# Patient Record
Sex: FEMALE | Race: WHITE | NOT HISPANIC OR LATINO | Employment: UNEMPLOYED | URBAN - METROPOLITAN AREA
[De-identification: names, ages, dates, MRNs, and addresses within clinical notes are randomized per-mention and may not be internally consistent; named-entity substitution may affect disease eponyms.]

---

## 2023-02-07 PROBLEM — J33.9 NASAL POLYPS: Status: ACTIVE | Noted: 2023-02-07

## 2023-02-07 PROBLEM — F12.10 MARIJUANA ABUSE: Status: ACTIVE | Noted: 2023-02-07

## 2023-02-07 PROBLEM — M19.112 OSTEOARTHRITIS OF SHOULDERS DUE TO ROTATOR CUFF INJURY, BILATERAL: Status: ACTIVE | Noted: 2023-02-07

## 2023-02-07 PROBLEM — M51.369 LUMBAR DEGENERATIVE DISC DISEASE: Status: ACTIVE | Noted: 2023-02-07

## 2023-02-07 PROBLEM — I10 ESSENTIAL HYPERTENSION: Status: ACTIVE | Noted: 2023-02-07

## 2023-02-07 PROBLEM — S46.001S OSTEOARTHRITIS OF SHOULDERS DUE TO ROTATOR CUFF INJURY, BILATERAL: Status: ACTIVE | Noted: 2023-02-07

## 2023-02-07 PROBLEM — M89.9 DISORDER OF BONE: Status: ACTIVE | Noted: 2023-02-07

## 2023-02-07 PROBLEM — M79.18 MYOFASCIAL PAIN SYNDROME, CERVICAL: Status: ACTIVE | Noted: 2023-02-07

## 2023-02-07 PROBLEM — M51.36 LUMBAR DEGENERATIVE DISC DISEASE: Status: ACTIVE | Noted: 2023-02-07

## 2023-02-07 PROBLEM — N32.9 LESION OF BLADDER: Status: ACTIVE | Noted: 2023-02-07

## 2023-02-07 PROBLEM — N31.8 FREQUENCY-URGENCY SYNDROME: Status: ACTIVE | Noted: 2023-02-07

## 2023-02-07 PROBLEM — K63.5 BENIGN COLONIC POLYP: Status: ACTIVE | Noted: 2023-02-07

## 2023-02-07 PROBLEM — F11.20 OPIOID DEPENDENCE (MULTI): Status: ACTIVE | Noted: 2023-02-07

## 2023-02-07 PROBLEM — G89.4 CHRONIC PAIN SYNDROME: Status: ACTIVE | Noted: 2023-02-07

## 2023-02-07 PROBLEM — S46.002S OSTEOARTHRITIS OF SHOULDERS DUE TO ROTATOR CUFF INJURY, BILATERAL: Status: ACTIVE | Noted: 2023-02-07

## 2023-02-07 PROBLEM — E78.5 DYSLIPIDEMIA, GOAL LDL BELOW 100: Status: ACTIVE | Noted: 2023-02-07

## 2023-02-07 PROBLEM — K64.9 HEMORRHOIDS: Status: ACTIVE | Noted: 2023-02-07

## 2023-02-07 PROBLEM — K58.0 IRRITABLE BOWEL SYNDROME WITH DIARRHEA: Status: ACTIVE | Noted: 2023-02-07

## 2023-02-07 PROBLEM — M19.111 OSTEOARTHRITIS OF SHOULDERS DUE TO ROTATOR CUFF INJURY, BILATERAL: Status: ACTIVE | Noted: 2023-02-07

## 2023-02-07 PROBLEM — F33.1 MODERATE EPISODE OF RECURRENT MAJOR DEPRESSIVE DISORDER (MULTI): Status: ACTIVE | Noted: 2023-02-07

## 2023-02-07 PROBLEM — J45.20 MILD INTERMITTENT ASTHMA WITHOUT COMPLICATION (HHS-HCC): Status: ACTIVE | Noted: 2023-02-07

## 2023-02-07 PROBLEM — J30.9 ALLERGIC RHINITIS: Status: ACTIVE | Noted: 2023-02-07

## 2023-02-07 PROBLEM — E66.811 CLASS 1 OBESITY DUE TO EXCESS CALORIES WITH SERIOUS COMORBIDITY AND BODY MASS INDEX (BMI) OF 30.0 TO 30.9 IN ADULT: Status: ACTIVE | Noted: 2023-02-07

## 2023-02-07 PROBLEM — R55 NEUROCARDIOGENIC PRE-SYNCOPE: Status: ACTIVE | Noted: 2023-02-07

## 2023-02-07 PROBLEM — K21.9 GASTROESOPHAGEAL REFLUX DISEASE WITHOUT ESOPHAGITIS: Status: ACTIVE | Noted: 2023-02-07

## 2023-02-07 PROBLEM — J32.0 CHRONIC MAXILLARY SINUSITIS: Status: RESOLVED | Noted: 2023-02-07 | Resolved: 2023-02-07

## 2023-02-07 PROBLEM — E66.09 CLASS 1 OBESITY DUE TO EXCESS CALORIES WITH SERIOUS COMORBIDITY AND BODY MASS INDEX (BMI) OF 30.0 TO 30.9 IN ADULT: Status: ACTIVE | Noted: 2023-02-07

## 2023-02-07 RX ORDER — FLUTICASONE PROPIONATE 50 MCG
2 SPRAY, SUSPENSION (ML) NASAL DAILY
COMMUNITY
Start: 2021-03-15

## 2023-02-07 RX ORDER — PANTOPRAZOLE SODIUM 40 MG/1
1 TABLET, DELAYED RELEASE ORAL 2 TIMES DAILY
COMMUNITY
Start: 2020-05-11 | End: 2023-09-20

## 2023-02-07 RX ORDER — NALOXONE HYDROCHLORIDE 4 MG/.1ML
SPRAY NASAL
COMMUNITY
Start: 2020-05-26 | End: 2024-04-12 | Stop reason: SDUPTHER

## 2023-02-07 RX ORDER — SENNOSIDES 25 MG/1
TABLET, FILM COATED ORAL
COMMUNITY
Start: 2022-10-07

## 2023-02-07 RX ORDER — GABAPENTIN 600 MG/1
1 TABLET ORAL 3 TIMES DAILY
COMMUNITY
Start: 2019-02-20 | End: 2024-01-08

## 2023-02-07 RX ORDER — METAXALONE 800 MG/1
1 TABLET ORAL 2 TIMES DAILY
COMMUNITY
Start: 2019-07-03

## 2023-02-07 RX ORDER — LISINOPRIL 20 MG/1
1 TABLET ORAL DAILY
COMMUNITY
Start: 2019-08-15 | End: 2023-07-13

## 2023-02-07 RX ORDER — LIDOCAINE 50 MG/G
PATCH TOPICAL
COMMUNITY
Start: 2020-09-09

## 2023-02-07 RX ORDER — ALBUTEROL SULFATE 90 UG/1
AEROSOL, METERED RESPIRATORY (INHALATION)
COMMUNITY
Start: 2020-03-24

## 2023-02-07 RX ORDER — MONTELUKAST SODIUM 10 MG/1
1 TABLET ORAL NIGHTLY
COMMUNITY
Start: 2016-06-08 | End: 2023-06-23

## 2023-02-07 RX ORDER — PAROXETINE HYDROCHLORIDE 40 MG/1
1 TABLET, FILM COATED ORAL DAILY
COMMUNITY
Start: 2021-12-30 | End: 2023-03-28

## 2023-02-07 RX ORDER — BUPRENORPHINE AND NALOXONE 8; 2 MG/1; MG/1
1 FILM, SOLUBLE BUCCAL; SUBLINGUAL DAILY
COMMUNITY
Start: 2020-05-19 | End: 2023-03-20 | Stop reason: SDUPTHER

## 2023-03-20 ENCOUNTER — OFFICE VISIT (OUTPATIENT)
Dept: PRIMARY CARE | Facility: CLINIC | Age: 68
End: 2023-03-20
Payer: MEDICARE

## 2023-03-20 VITALS — DIASTOLIC BLOOD PRESSURE: 69 MMHG | BODY MASS INDEX: 31.07 KG/M2 | WEIGHT: 181 LBS | SYSTOLIC BLOOD PRESSURE: 125 MMHG

## 2023-03-20 DIAGNOSIS — F11.20 UNCOMPLICATED OPIOID DEPENDENCE (MULTI): ICD-10-CM

## 2023-03-20 LAB
POC AMPHETAMINE: NEGATIVE NG/ML
POC BARBITURATES: NEGATIVE NG/ML
POC BENZODIAZEPINES: NEGATIVE NG/ML
POC BUPRENORPHINE URINE: POSITIVE NG/ML
POC COCAINE: NEGATIVE NG/ML
POC MDMA (NG/ML) IN URINE: NEGATIVE NG/ML
POC METHADONE MANUALLY ENTERED: NEGATIVE NG/ML
POC METHAMPHETAMINE: NEGATIVE NG/ML
POC MORPHINE URINE: NEGATIVE NG/ML
POC OPIATES: NEGATIVE NG/ML
POC OXYCODONE: NEGATIVE NG/ML
POC PHENCYCLIDINE (PCP): NEGATIVE NG/ML
POC THC: NEGATIVE NG/ML
POC TICYCLIC ANTIDEPRESSANTS (TCA): ABNORMAL NG/ML

## 2023-03-20 PROCEDURE — 80348 DRUG SCREENING BUPRENORPHINE: CPT

## 2023-03-20 PROCEDURE — 80373 DRUG SCREENING TRAMADOL: CPT

## 2023-03-20 PROCEDURE — 80305 DRUG TEST PRSMV DIR OPT OBS: CPT | Performed by: FAMILY MEDICINE

## 2023-03-20 PROCEDURE — 1036F TOBACCO NON-USER: CPT | Performed by: FAMILY MEDICINE

## 2023-03-20 PROCEDURE — 1160F RVW MEDS BY RX/DR IN RCRD: CPT | Performed by: FAMILY MEDICINE

## 2023-03-20 PROCEDURE — 80361 OPIATES 1 OR MORE: CPT

## 2023-03-20 PROCEDURE — 1159F MED LIST DOCD IN RCRD: CPT | Performed by: FAMILY MEDICINE

## 2023-03-20 PROCEDURE — 80365 DRUG SCREENING OXYCODONE: CPT

## 2023-03-20 PROCEDURE — 3074F SYST BP LT 130 MM HG: CPT | Performed by: FAMILY MEDICINE

## 2023-03-20 PROCEDURE — 99214 OFFICE O/P EST MOD 30 MIN: CPT | Performed by: FAMILY MEDICINE

## 2023-03-20 PROCEDURE — 80358 DRUG SCREENING METHADONE: CPT

## 2023-03-20 PROCEDURE — 3078F DIAST BP <80 MM HG: CPT | Performed by: FAMILY MEDICINE

## 2023-03-20 PROCEDURE — 80346 BENZODIAZEPINES1-12: CPT

## 2023-03-20 PROCEDURE — 80354 DRUG SCREENING FENTANYL: CPT

## 2023-03-20 PROCEDURE — 80307 DRUG TEST PRSMV CHEM ANLYZR: CPT

## 2023-03-20 PROCEDURE — 80368 SEDATIVE HYPNOTICS: CPT

## 2023-03-20 RX ORDER — BUPRENORPHINE AND NALOXONE 8; 2 MG/1; MG/1
1 FILM, SOLUBLE BUCCAL; SUBLINGUAL DAILY
Qty: 28 FILM | Refills: 0 | Status: SHIPPED | OUTPATIENT
Start: 2023-03-20 | End: 2023-04-17 | Stop reason: SDUPTHER

## 2023-03-20 ASSESSMENT — PATIENT HEALTH QUESTIONNAIRE - PHQ9
2. FEELING DOWN, DEPRESSED OR HOPELESS: NOT AT ALL
SUM OF ALL RESPONSES TO PHQ9 QUESTIONS 1 AND 2: 0
1. LITTLE INTEREST OR PLEASURE IN DOING THINGS: NOT AT ALL

## 2023-03-20 NOTE — PROGRESS NOTES
Subjective   Patient ID: Jenna Ca is a 67 y.o. female who presents for opioid dependency (Uds, bup, uds2).    HPI   Pt felt functional at daily activities with buprenorphine tx. Pt denies having cravings for opioids or withdrawals symptoms. Pt denies abusing any opioids. Pt has been attending NA/AA meeting or counseling. No eoth use. No HA, constipation, imbalance or confusion. No confusion, sweating, agitation. No trouble falling or staying asleep. Pt would have cravings without buprenorphine treatment. Pt denies depressed mood. No HI/SI.    Review of Systems    Objective   /69   Wt 82.1 kg (181 lb)   BMI 31.07 kg/m²     Physical Exam  no acute distress, well groomed, eyes: no sclera icterus, normal pupil size, neck is supple, no exertional respiration, Lungs: CTA bilaterally, heart: RRR, abdomen: soft, no tenderness, BS+, normal balance, good judgment, memory and insight, good mood. Normal affect. No HI/SI or paranoia    Assessment/Plan     Opioid dependence, Pt denies cravings and withdrawals. Pt denies any opioid abuse. Pt felt functional with current buprenorphine tx.    I personally reviewed pt's urine drug screen performed in office today.  The urine drug screen was negative for opiate but + for bup.   I reviewed pt's updated OARRS report today.  The OARRS report showed that pt filled buprenorphine as prescribed. Caution patient that transmucosal buprenorphine can increase the risk of dental problems, including tooth decay, cavities, oral infections and loss of teeth. Recommend  the pt to see a dentist for dental exam twice a year. Recommend pt to gently rinse teeth and gums with water after the medicine has completely dissolved.  Wait at least 1 hour before brushing teeth. Informed pt of the alternative treatment for opioid dependence such as Probuphine implant, Sublocade injection, Vivitrol shot, oral naltrexone pills and in-patient rehabilitation. Pt prefers to cont current dose of  buprenorphine treatment.   Pt understood to avoid  benzodiazepine if possible and alcohol which can interact with buprenorphine causing respiratory suppression and potential death. Pt understood that buprenorphine is addictive. Caution that buprenorphine diversion is against the law. Recommend pt to keep buprenorphine out of reach of children. Recommend to keep naloxone available in case of opioid OD. Encourage counseling for opioid dependence. Pt should not drive or operate machinery if incoordination or confusion occurs. I have considered the risks of abuse, dependence, addiction and diversion. I believe that it is clinically appropriate for the pt to continue current buprenorphine treatment. RTC as scheduled.

## 2023-03-23 LAB
6-ACETYLMORPHINE: <25 NG/ML
7-AMINOCLONAZEPAM: <25 NG/ML
ALPHA-HYDROXYALPRAZOLAM: <25 NG/ML
ALPHA-HYDROXYMIDAZOLAM: <25 NG/ML
ALPRAZOLAM: <25 NG/ML
AMPHETAMINE (PRESENCE) IN URINE BY SCREEN METHOD: NORMAL
BARBITURATES PRESENCE IN URINE BY SCREEN METHOD: NORMAL
CANNABINOIDS IN URINE BY SCREEN METHOD: NORMAL
CHLORDIAZEPOXIDE: <25 NG/ML
CLONAZEPAM: <25 NG/ML
COCAINE (PRESENCE) IN URINE BY SCREEN METHOD: NORMAL
CODEINE: <50 NG/ML
CREATINE, URINE FOR DRUG: 141.7 MG/DL
DIAZEPAM: <25 NG/ML
DRUG SCREEN COMMENT URINE: NORMAL
EDDP: <25 NG/ML
FENTANYL CONFIRMATION, URINE: <2.5 NG/ML
HYDROCODONE: <25 NG/ML
HYDROMORPHONE: <25 NG/ML
LORAZEPAM: <25 NG/ML
METHADONE CONFIRMATION,URINE: <25 NG/ML
MIDAZOLAM: <25 NG/ML
MORPHINE URINE: <50 NG/ML
NORDIAZEPAM: <25 NG/ML
NORFENTANYL: <2.5 NG/ML
NORHYDROCODONE: <25 NG/ML
NOROXYCODONE: <25 NG/ML
O-DESMETHYLTRAMADOL: <50 NG/ML
OXAZEPAM: <25 NG/ML
OXYCODONE: <25 NG/ML
OXYMORPHONE: <25 NG/ML
PHENCYCLIDINE (PRESENCE) IN URINE BY SCREEN METHOD: NORMAL
TEMAZEPAM: <25 NG/ML
TRAMADOL: <50 NG/ML
ZOLPIDEM METABOLITE (ZCA): <25 NG/ML
ZOLPIDEM: <25 NG/ML

## 2023-03-28 DIAGNOSIS — F33.1 MAJOR DEPRESSIVE DISORDER, RECURRENT, MODERATE (MULTI): ICD-10-CM

## 2023-03-28 RX ORDER — PAROXETINE HYDROCHLORIDE 40 MG/1
TABLET, FILM COATED ORAL
Qty: 90 TABLET | Refills: 3 | Status: SHIPPED | OUTPATIENT
Start: 2023-03-28 | End: 2024-03-18

## 2023-04-03 ENCOUNTER — APPOINTMENT (OUTPATIENT)
Dept: PRIMARY CARE | Facility: CLINIC | Age: 68
End: 2023-04-03
Payer: MEDICARE

## 2023-04-03 LAB
BUPRENORPHINE ,URINE: <2 NG/ML
BUPRENORPHINE GLUC, URINE: 214 NG/ML
NALOXONE, URINE: <100 NG/ML
NORBUPRENORPHINE GLUC,URINE: 193 NG/ML
NORBUPRENORPHINE, URINE: 81 NG/ML

## 2023-04-17 ENCOUNTER — OFFICE VISIT (OUTPATIENT)
Dept: PRIMARY CARE | Facility: CLINIC | Age: 68
End: 2023-04-17
Payer: MEDICARE

## 2023-04-17 VITALS — SYSTOLIC BLOOD PRESSURE: 120 MMHG | DIASTOLIC BLOOD PRESSURE: 62 MMHG

## 2023-04-17 DIAGNOSIS — F11.20 UNCOMPLICATED OPIOID DEPENDENCE (MULTI): Primary | ICD-10-CM

## 2023-04-17 PROCEDURE — 3074F SYST BP LT 130 MM HG: CPT | Performed by: FAMILY MEDICINE

## 2023-04-17 PROCEDURE — 99214 OFFICE O/P EST MOD 30 MIN: CPT | Performed by: FAMILY MEDICINE

## 2023-04-17 PROCEDURE — 3078F DIAST BP <80 MM HG: CPT | Performed by: FAMILY MEDICINE

## 2023-04-17 PROCEDURE — 80305 DRUG TEST PRSMV DIR OPT OBS: CPT | Performed by: FAMILY MEDICINE

## 2023-04-17 PROCEDURE — 1160F RVW MEDS BY RX/DR IN RCRD: CPT | Performed by: FAMILY MEDICINE

## 2023-04-17 PROCEDURE — 1036F TOBACCO NON-USER: CPT | Performed by: FAMILY MEDICINE

## 2023-04-17 PROCEDURE — 1159F MED LIST DOCD IN RCRD: CPT | Performed by: FAMILY MEDICINE

## 2023-04-17 RX ORDER — BUPRENORPHINE AND NALOXONE 8; 2 MG/1; MG/1
1 FILM, SOLUBLE BUCCAL; SUBLINGUAL DAILY
Qty: 28 FILM | Refills: 0 | Status: SHIPPED | OUTPATIENT
Start: 2023-04-17 | End: 2023-05-16 | Stop reason: SDUPTHER

## 2023-04-17 NOTE — PROGRESS NOTES
Subjective   Patient ID: Jenna Ca is a 67 y.o. female who presents for buprenorphine treatment.    HPI   Pt denies having cravings for opioids or withdrawals symptoms. Pt denies abusing any opioids.  Pt felt functional at daily activities with buprenorphine tx. Pt has been attending NA/AA meeting or counseling. Pt denies  eoth use. No HA, constipation, imbalance, confusion, sweating, agitation. No insomnia. Pt would have cravings without buprenorphine treatment. Pt denies depressed mood.     Review of Systems    Objective   There were no vitals taken for this visit.    Physical Exam  Well groomed, eyes: no sclera icterus, normal pupil size, no exertional respiration, Lungs: CTA bilaterally, heart: RRR, abdomen: soft, no tenderness, BS+, normal balance, good judgment, good mood. Normal affect. No HI/SI     Assessment/Plan     Opioid dependence, Pt denies cravings and withdrawals. Pt denies any opioid abuse.   I personally reviewed pt's urine drug screen performed in office today.  The urine drug screen was negative for opiate but + for bup.   I reviewed pt's updated OARRS report today.  The OARRS report showed no physician shopping. Caution patient that transmucosal buprenorphine can increase the risk of tooth decay, cavities, oral infections and loss of teeth. Alternative treatment for opioid dependence such as Probuphine implant, Sublocade injection are available.  Pt understood that buprenorphine is addictive. Caution that buprenorphine diversion is against the law. Recommend pt to keep buprenorphine out of reach of children. Recommend to keep naloxone available in case of opioid OD. Encourage counseling for opioid dependence. Pt should not drive or operate machinery if incoordination or confusion occurs. I have considered the risks of abuse, dependence, addiction and diversion. I believe that it is clinically appropriate for the pt to continue current buprenorphine treatment. RTC as scheduled.

## 2023-04-18 LAB
POC AMPHETAMINE: NEGATIVE NG/ML
POC BARBITURATES: NEGATIVE NG/ML
POC BENZODIAZEPINES: NEGATIVE NG/ML
POC BUPRENORPHINE URINE: POSITIVE NG/ML
POC COCAINE: NEGATIVE NG/ML
POC MDMA (NG/ML) IN URINE: NEGATIVE NG/ML
POC METHADONE MANUALLY ENTERED: NEGATIVE NG/ML
POC METHAMPHETAMINE: NEGATIVE NG/ML
POC MORPHINE URINE: NEGATIVE NG/ML
POC OPIATES: NEGATIVE NG/ML
POC OXYCODONE: NEGATIVE NG/ML
POC PHENCYCLIDINE (PCP): NEGATIVE NG/ML
POC THC: NEGATIVE NG/ML

## 2023-05-16 ENCOUNTER — OFFICE VISIT (OUTPATIENT)
Dept: PRIMARY CARE | Facility: CLINIC | Age: 68
End: 2023-05-16
Payer: MEDICARE

## 2023-05-16 VITALS — SYSTOLIC BLOOD PRESSURE: 130 MMHG | DIASTOLIC BLOOD PRESSURE: 76 MMHG | HEART RATE: 76 BPM | RESPIRATION RATE: 15 BRPM

## 2023-05-16 DIAGNOSIS — F11.20 UNCOMPLICATED OPIOID DEPENDENCE (MULTI): ICD-10-CM

## 2023-05-16 PROCEDURE — 1160F RVW MEDS BY RX/DR IN RCRD: CPT | Performed by: FAMILY MEDICINE

## 2023-05-16 PROCEDURE — 99214 OFFICE O/P EST MOD 30 MIN: CPT | Performed by: FAMILY MEDICINE

## 2023-05-16 PROCEDURE — 1036F TOBACCO NON-USER: CPT | Performed by: FAMILY MEDICINE

## 2023-05-16 PROCEDURE — 80305 DRUG TEST PRSMV DIR OPT OBS: CPT | Performed by: FAMILY MEDICINE

## 2023-05-16 PROCEDURE — 3075F SYST BP GE 130 - 139MM HG: CPT | Performed by: FAMILY MEDICINE

## 2023-05-16 PROCEDURE — 1159F MED LIST DOCD IN RCRD: CPT | Performed by: FAMILY MEDICINE

## 2023-05-16 PROCEDURE — 3078F DIAST BP <80 MM HG: CPT | Performed by: FAMILY MEDICINE

## 2023-05-16 RX ORDER — BUPRENORPHINE AND NALOXONE 8; 2 MG/1; MG/1
1 FILM, SOLUBLE BUCCAL; SUBLINGUAL DAILY
Qty: 28 FILM | Refills: 0 | Status: SHIPPED | OUTPATIENT
Start: 2023-05-16 | End: 2023-06-12 | Stop reason: SDUPTHER

## 2023-05-16 NOTE — PROGRESS NOTES
Subjective   Patient ID: Jenna Ca is a 67 y.o. female who presents for opioid dependency (uds).    HPI   Pt felt functional at daily activities with buprenorphine tx. Pt denies having cravings for opioids or withdrawals symptoms. Pt denies abusing any opioids. Pt has been attending NA/AA meeting or counseling. No eoth use. No HA, constipation, imbalance or confusion. No trouble falling or staying asleep. Pt would have cravings and withdrawals without buprenorphine treatment. No depressed mood.   Review of Systems    Objective   /76   Pulse 76   Resp 15     Physical Exam  No acute distress, well groomed, eyes: no sclera icterus, normal pupil size, no exertional respiration, Lungs: CTA bilaterally, heart: RRR, abdomen: soft, no tenderness, BS+, normal balance, good judgment, memory and insight, good mood. Normal affect. No HI/SI or paranoia    Assessment/Plan        Opioid dependence, Pt denies cravings and withdrawals. Pt denies any opioid abuse. Pt felt functional with buprenorphine tx.    I personally reviewed pt's urine drug screen performed in office today.  The urine drug screen was + for buprenorphine but negative for opiate. I reviewed pt's updated OARRS report today.  The OARRS report showed that pt filled buprenorphine as prescribed. Pt has not filled other opioid medications recently. Recommend the pt to see a dentist for dental exam twice a year while on buprenorphine. Recommend pt to gently rinse teeth and gums with water after the medicine has completely dissolved.  Informed pt of the alternative medication treatment for opioid dependence such as Probuphine implant, SUBLOCADE injection, vivitrol shot, oral naltrexone pills. Pt prefers to continue current of buprenorphine treatment. Pt understood to avoid alcohol and benzodiazepine which can interact with buprenorphine causing respiratory suppression and potential death. Pt understood that buprenorphine is addictive. Caution that  buprenorphine diversion is against the law. Recommend pt to keep buprenorphine out of reach of children. Recommend to keep naloxone available in case of opioid OD. Encourage counseling for opioid dependence. Pt should not drive or operate machinery if incoordination or confusion occurs. I have considered the risks of abuse, dependence, addiction and diversion. I believe that it is clinically appropriate for the pt to continue current buprenorphine treatment. RTC as scheduled.

## 2023-06-12 ENCOUNTER — OFFICE VISIT (OUTPATIENT)
Dept: PRIMARY CARE | Facility: CLINIC | Age: 68
End: 2023-06-12
Payer: MEDICARE

## 2023-06-12 VITALS — SYSTOLIC BLOOD PRESSURE: 125 MMHG | DIASTOLIC BLOOD PRESSURE: 76 MMHG

## 2023-06-12 DIAGNOSIS — Z79.899 PRESCRIPTION MEDICATION STARTED: ICD-10-CM

## 2023-06-12 DIAGNOSIS — F11.20 UNCOMPLICATED OPIOID DEPENDENCE (MULTI): Primary | ICD-10-CM

## 2023-06-12 PROBLEM — F12.10 MARIJUANA ABUSE: Status: RESOLVED | Noted: 2023-02-07 | Resolved: 2023-06-12

## 2023-06-12 LAB
POC AMPHETAMINE: NEGATIVE NG/ML
POC COCAINE: NEGATIVE NG/ML
POC METHAMPHETAMINE: NEGATIVE NG/ML
POC OPIATES: NEGATIVE NG/ML
POC THC: NEGATIVE NG/ML

## 2023-06-12 PROCEDURE — 1160F RVW MEDS BY RX/DR IN RCRD: CPT | Performed by: FAMILY MEDICINE

## 2023-06-12 PROCEDURE — 99214 OFFICE O/P EST MOD 30 MIN: CPT | Performed by: FAMILY MEDICINE

## 2023-06-12 PROCEDURE — 80305 DRUG TEST PRSMV DIR OPT OBS: CPT | Performed by: FAMILY MEDICINE

## 2023-06-12 PROCEDURE — 1036F TOBACCO NON-USER: CPT | Performed by: FAMILY MEDICINE

## 2023-06-12 PROCEDURE — 3074F SYST BP LT 130 MM HG: CPT | Performed by: FAMILY MEDICINE

## 2023-06-12 PROCEDURE — 1159F MED LIST DOCD IN RCRD: CPT | Performed by: FAMILY MEDICINE

## 2023-06-12 PROCEDURE — 3078F DIAST BP <80 MM HG: CPT | Performed by: FAMILY MEDICINE

## 2023-06-12 RX ORDER — BUPRENORPHINE AND NALOXONE 8; 2 MG/1; MG/1
1 FILM, SOLUBLE BUCCAL; SUBLINGUAL DAILY
Qty: 28 FILM | Refills: 0 | Status: SHIPPED | OUTPATIENT
Start: 2023-06-12 | End: 2023-07-10 | Stop reason: SDUPTHER

## 2023-06-12 NOTE — PROGRESS NOTES
Subjective   Patient ID: Jenna Ca is a 67 y.o. female who presents for Opioid dependence treatment      HPI   Pt stated that current dose of buprenorphine controlled cravings for opioids and withdrawals symptoms well. Pt feels normal and performs daily activities well.  Pt denies abusing any opioids. pt has social support and has been having counseling. pt denies drinking alcohol or taking benzodiazepine. No SE from buprenorphine  such as HA, constipation, imbalance or confusion. Pt sleeps well most night. Pt would have cravings and withdrawals without buprenorphine treatment. Pt denies depressed mood.   Review of Systems    Objective   /76     Physical Exam  No acute distress, well groomed, eyes: no sclera icterus, no exertional respiration, Lungs: CTA bilaterally, heart: RRR, abdomen: soft, no tenderness, BS+, normal station and gait, good  mood     Assessment/Plan     Opioid dependence, Pt stated that current dose of buprenorphine treatment controlled cravings and withdrawals well. Pt denies any opioid abuse.   I personally reviewed pt's urine drug screen done in office today.  The urine drug screen was negative for opiate. I reviewed pt's updated OARRS report today.  The OARRS report showed that pt filled buprenorphine consistently as prescribed. Caution patient that alcohol and benzo should be avoided while on buprenorphine. Pt understood that buprenorphine is addictive. Lending meds is illegal. Recommend pt to lock  buprenorphine in a safe place and  out of reach of children. Recommend to keep naloxone available in case of opioid OD. Cont counseling for opioid dependence. Pt should not drive or operate machinery if incoordination or confusion occurs. I have considered the risks of abuse, dependence, addiction and diversion. I believe that it is clinically appropriate for the pt to continue current buprenorphine treatment. Continue current dose of buprenorphine. RTC as scheduled

## 2023-06-20 ENCOUNTER — TELEPHONE (OUTPATIENT)
Dept: PRIMARY CARE | Facility: CLINIC | Age: 68
End: 2023-06-20
Payer: MEDICARE

## 2023-06-20 DIAGNOSIS — F33.1 MODERATE EPISODE OF RECURRENT MAJOR DEPRESSIVE DISORDER (MULTI): ICD-10-CM

## 2023-06-20 DIAGNOSIS — E66.09 CLASS 1 OBESITY DUE TO EXCESS CALORIES WITH SERIOUS COMORBIDITY AND BODY MASS INDEX (BMI) OF 30.0 TO 30.9 IN ADULT: Primary | ICD-10-CM

## 2023-06-20 DIAGNOSIS — E78.5 DYSLIPIDEMIA, GOAL LDL BELOW 100: ICD-10-CM

## 2023-06-20 DIAGNOSIS — R73.02 IGT (IMPAIRED GLUCOSE TOLERANCE): ICD-10-CM

## 2023-06-20 RX ORDER — ACETAMINOPHEN 500 MG
500 TABLET ORAL
COMMUNITY

## 2023-06-20 RX ORDER — PANCRELIPASE 36000; 180000; 114000 [USP'U]/1; [USP'U]/1; [USP'U]/1
3 CAPSULE, DELAYED RELEASE PELLETS ORAL
COMMUNITY
End: 2024-02-01 | Stop reason: ALTCHOICE

## 2023-06-21 ENCOUNTER — APPOINTMENT (OUTPATIENT)
Dept: PRIMARY CARE | Facility: CLINIC | Age: 68
End: 2023-06-21
Payer: MEDICARE

## 2023-06-21 NOTE — TELEPHONE ENCOUNTER
Called Jenna / labs ordered  
She has appointment with you August 1    Asking if there are any labs you want done before appointment ?  
patient

## 2023-06-23 DIAGNOSIS — J30.9 ALLERGIC RHINITIS, UNSPECIFIED: ICD-10-CM

## 2023-06-23 RX ORDER — MONTELUKAST SODIUM 10 MG/1
TABLET ORAL
Qty: 90 TABLET | Refills: 3 | Status: SHIPPED | OUTPATIENT
Start: 2023-06-23

## 2023-07-10 ENCOUNTER — OFFICE VISIT (OUTPATIENT)
Dept: PRIMARY CARE | Facility: CLINIC | Age: 68
End: 2023-07-10
Payer: MEDICARE

## 2023-07-10 VITALS — DIASTOLIC BLOOD PRESSURE: 68 MMHG | SYSTOLIC BLOOD PRESSURE: 132 MMHG

## 2023-07-10 DIAGNOSIS — F11.20 UNCOMPLICATED OPIOID DEPENDENCE (MULTI): Primary | ICD-10-CM

## 2023-07-10 PROCEDURE — 99213 OFFICE O/P EST LOW 20 MIN: CPT | Performed by: FAMILY MEDICINE

## 2023-07-10 PROCEDURE — 1160F RVW MEDS BY RX/DR IN RCRD: CPT | Performed by: FAMILY MEDICINE

## 2023-07-10 PROCEDURE — 1036F TOBACCO NON-USER: CPT | Performed by: FAMILY MEDICINE

## 2023-07-10 PROCEDURE — 3078F DIAST BP <80 MM HG: CPT | Performed by: FAMILY MEDICINE

## 2023-07-10 PROCEDURE — 1159F MED LIST DOCD IN RCRD: CPT | Performed by: FAMILY MEDICINE

## 2023-07-10 PROCEDURE — 3008F BODY MASS INDEX DOCD: CPT | Performed by: FAMILY MEDICINE

## 2023-07-10 PROCEDURE — 3075F SYST BP GE 130 - 139MM HG: CPT | Performed by: FAMILY MEDICINE

## 2023-07-10 RX ORDER — BUPRENORPHINE AND NALOXONE 8; 2 MG/1; MG/1
1 FILM, SOLUBLE BUCCAL; SUBLINGUAL DAILY
Qty: 28 FILM | Refills: 0 | Status: SHIPPED | OUTPATIENT
Start: 2023-07-10 | End: 2023-08-07 | Stop reason: SDUPTHER

## 2023-07-10 NOTE — PROGRESS NOTES
Subjective   Patient ID: Jenna Ca is a 67 y.o. female who presents for Opioid dependence treatment      HPI   Cravings for opioids and withdrawals symptoms were controlled with current dose of buprenorphine tx. Pt denies abusing any opioids. Patient has been attending meeting/counseling as recommended. Pt felt normal and functional with buprenorphine tx. Pt was able to maintain good relationship with other people.  Pt had good family  support. Pt denies drinking eoth. No headache, constipation, imbalance, insomnia, mental cloudiness, sweating, drowsiness or confusion. Good appetite. Pt would have cravings and withdrawals without buprenorphine treatment. pt denies having depressed mood.   Review of Systems    Objective   /68     Physical Exam  No acute distress, well groomed, eyes: normal pupil size, no sclera icterus, Lungs: CTA bilaterally, heart: RRR, abdomen: soft, no tenderness, BS+, no imbalance. Normal mood and affect. No HI/SI     Assessment/Plan        Opioid dependence, pt feels functional at daily activities while on buprenorphine treatment. Pt has been attending counseling/NA/AA meeting for opioid dependence. Pt denies any opioid abuse. Cravings and withdrawals were controlled.  reviewed pt's OARRS report today. The OARRS report showed pt filled medications as prescribed.   Inform pt to avoid  alcohol  while on buprenorphine tx. I have considered the risks of abuse, dependence, addiction and diversion. I believe that it is clinically appropriate for the pt to continue current buprenorphine treatment. Continue counseling for opioid dependence. Recommend pt to keep  buprenorphine in a secure place and out of reach of children. Inform pt that lending buprenorphine is illegal. Recommend to keep narcan available in case of opioid OD. Pt should not drive or operate machinery if incoordination or confusion occurs. f/u as scheduled

## 2023-07-13 DIAGNOSIS — I10 ESSENTIAL (PRIMARY) HYPERTENSION: ICD-10-CM

## 2023-07-13 RX ORDER — LISINOPRIL 20 MG/1
TABLET ORAL
Qty: 90 TABLET | Refills: 3 | Status: SHIPPED | OUTPATIENT
Start: 2023-07-13

## 2023-07-19 ENCOUNTER — LAB (OUTPATIENT)
Dept: LAB | Facility: LAB | Age: 68
End: 2023-07-19
Payer: MEDICARE

## 2023-07-19 DIAGNOSIS — F33.1 MODERATE EPISODE OF RECURRENT MAJOR DEPRESSIVE DISORDER (MULTI): ICD-10-CM

## 2023-07-19 DIAGNOSIS — E55.9 VITAMIN D DEFICIENCY: Primary | ICD-10-CM

## 2023-07-19 DIAGNOSIS — E78.5 DYSLIPIDEMIA, GOAL LDL BELOW 100: ICD-10-CM

## 2023-07-19 DIAGNOSIS — E66.09 CLASS 1 OBESITY DUE TO EXCESS CALORIES WITH SERIOUS COMORBIDITY AND BODY MASS INDEX (BMI) OF 30.0 TO 30.9 IN ADULT: ICD-10-CM

## 2023-07-19 LAB
BASOPHILS (10*3/UL) IN BLOOD BY AUTOMATED COUNT: 0.04 X10E9/L (ref 0–0.1)
BASOPHILS/100 LEUKOCYTES IN BLOOD BY AUTOMATED COUNT: 0.7 % (ref 0–2)
CALCIDIOL (25 OH VITAMIN D3) (NG/ML) IN SER/PLAS: 11 NG/ML
EOSINOPHILS (10*3/UL) IN BLOOD BY AUTOMATED COUNT: 0.14 X10E9/L (ref 0–0.7)
EOSINOPHILS/100 LEUKOCYTES IN BLOOD BY AUTOMATED COUNT: 2.4 % (ref 0–6)
ERYTHROCYTE DISTRIBUTION WIDTH (RATIO) BY AUTOMATED COUNT: 13.2 % (ref 11.5–14.5)
ERYTHROCYTE MEAN CORPUSCULAR HEMOGLOBIN CONCENTRATION (G/DL) BY AUTOMATED: 32.5 G/DL (ref 32–36)
ERYTHROCYTE MEAN CORPUSCULAR VOLUME (FL) BY AUTOMATED COUNT: 84 FL (ref 80–100)
ERYTHROCYTES (10*6/UL) IN BLOOD BY AUTOMATED COUNT: 4.57 X10E12/L (ref 4–5.2)
HEMATOCRIT (%) IN BLOOD BY AUTOMATED COUNT: 38.5 % (ref 36–46)
HEMOGLOBIN (G/DL) IN BLOOD: 12.5 G/DL (ref 12–16)
IMMATURE GRANULOCYTES/100 LEUKOCYTES IN BLOOD BY AUTOMATED COUNT: 0.3 % (ref 0–0.9)
LEUKOCYTES (10*3/UL) IN BLOOD BY AUTOMATED COUNT: 5.7 X10E9/L (ref 4.4–11.3)
LYMPHOCYTES (10*3/UL) IN BLOOD BY AUTOMATED COUNT: 1.68 X10E9/L (ref 1.2–4.8)
LYMPHOCYTES/100 LEUKOCYTES IN BLOOD BY AUTOMATED COUNT: 29.4 % (ref 13–44)
MONOCYTES (10*3/UL) IN BLOOD BY AUTOMATED COUNT: 0.48 X10E9/L (ref 0.1–1)
MONOCYTES/100 LEUKOCYTES IN BLOOD BY AUTOMATED COUNT: 8.4 % (ref 2–10)
NEUTROPHILS (10*3/UL) IN BLOOD BY AUTOMATED COUNT: 3.36 X10E9/L (ref 1.2–7.7)
NEUTROPHILS/100 LEUKOCYTES IN BLOOD BY AUTOMATED COUNT: 58.8 % (ref 40–80)
PLATELETS (10*3/UL) IN BLOOD AUTOMATED COUNT: 378 X10E9/L (ref 150–450)
THYROTROPIN (MIU/L) IN SER/PLAS BY DETECTION LIMIT <= 0.05 MIU/L: 1.03 MIU/L (ref 0.44–3.98)

## 2023-07-19 PROCEDURE — 85025 COMPLETE CBC W/AUTO DIFF WBC: CPT

## 2023-07-19 PROCEDURE — 84443 ASSAY THYROID STIM HORMONE: CPT

## 2023-07-19 PROCEDURE — 36415 COLL VENOUS BLD VENIPUNCTURE: CPT

## 2023-07-19 PROCEDURE — 82306 VITAMIN D 25 HYDROXY: CPT

## 2023-07-19 RX ORDER — ERGOCALCIFEROL 1.25 MG/1
CAPSULE ORAL
Qty: 4 CAPSULE | Refills: 2 | Status: SHIPPED | OUTPATIENT
Start: 2023-07-19 | End: 2024-01-25 | Stop reason: SDUPTHER

## 2023-07-20 ENCOUNTER — TELEPHONE (OUTPATIENT)
Dept: PRIMARY CARE | Facility: CLINIC | Age: 68
End: 2023-07-20
Payer: MEDICARE

## 2023-07-20 NOTE — TELEPHONE ENCOUNTER
----- Message from Jared Rojo DO sent at 7/19/2023  9:08 PM EDT -----  Labs reviewed vitamin D level very low at 11 we will start 50,000 units vitamin D2 twice a week for 12 weeks and reevaluate with labs in 12 weeks

## 2023-07-20 NOTE — RESULT ENCOUNTER NOTE
Labs reviewed vitamin D level very low at 11 we will start 50,000 units vitamin D2 twice a week for 12 weeks and reevaluate with labs in 12 weeks

## 2023-08-01 ENCOUNTER — OFFICE VISIT (OUTPATIENT)
Dept: PRIMARY CARE | Facility: CLINIC | Age: 68
End: 2023-08-01
Payer: MEDICARE

## 2023-08-01 VITALS
DIASTOLIC BLOOD PRESSURE: 74 MMHG | HEART RATE: 68 BPM | BODY MASS INDEX: 28.93 KG/M2 | WEIGHT: 180 LBS | HEIGHT: 66 IN | SYSTOLIC BLOOD PRESSURE: 106 MMHG

## 2023-08-01 DIAGNOSIS — E55.9 VITAMIN D DEFICIENCY: ICD-10-CM

## 2023-08-01 DIAGNOSIS — S46.002S OSTEOARTHRITIS OF SHOULDERS DUE TO ROTATOR CUFF INJURY, BILATERAL: ICD-10-CM

## 2023-08-01 DIAGNOSIS — Z00.00 MEDICARE ANNUAL WELLNESS VISIT, SUBSEQUENT: Primary | ICD-10-CM

## 2023-08-01 DIAGNOSIS — Z00.00 ROUTINE GENERAL MEDICAL EXAMINATION AT HEALTH CARE FACILITY: ICD-10-CM

## 2023-08-01 DIAGNOSIS — M19.112 OSTEOARTHRITIS OF SHOULDERS DUE TO ROTATOR CUFF INJURY, BILATERAL: ICD-10-CM

## 2023-08-01 DIAGNOSIS — E78.5 DYSLIPIDEMIA: ICD-10-CM

## 2023-08-01 DIAGNOSIS — K58.0 IRRITABLE BOWEL SYNDROME WITH DIARRHEA: ICD-10-CM

## 2023-08-01 DIAGNOSIS — M19.111 OSTEOARTHRITIS OF SHOULDERS DUE TO ROTATOR CUFF INJURY, BILATERAL: ICD-10-CM

## 2023-08-01 DIAGNOSIS — S46.001S OSTEOARTHRITIS OF SHOULDERS DUE TO ROTATOR CUFF INJURY, BILATERAL: ICD-10-CM

## 2023-08-01 PROBLEM — M89.9 DISORDER OF BONE: Status: RESOLVED | Noted: 2023-02-07 | Resolved: 2023-08-01

## 2023-08-01 PROBLEM — Z79.899 PRESCRIPTION MEDICATION STARTED: Status: RESOLVED | Noted: 2023-06-12 | Resolved: 2023-08-01

## 2023-08-01 PROCEDURE — 1159F MED LIST DOCD IN RCRD: CPT | Performed by: INTERNAL MEDICINE

## 2023-08-01 PROCEDURE — G0442 ANNUAL ALCOHOL SCREEN 15 MIN: HCPCS | Performed by: INTERNAL MEDICINE

## 2023-08-01 PROCEDURE — G0439 PPPS, SUBSEQ VISIT: HCPCS | Performed by: INTERNAL MEDICINE

## 2023-08-01 PROCEDURE — 3078F DIAST BP <80 MM HG: CPT | Performed by: INTERNAL MEDICINE

## 2023-08-01 PROCEDURE — 3074F SYST BP LT 130 MM HG: CPT | Performed by: INTERNAL MEDICINE

## 2023-08-01 PROCEDURE — 99214 OFFICE O/P EST MOD 30 MIN: CPT | Performed by: INTERNAL MEDICINE

## 2023-08-01 PROCEDURE — 3008F BODY MASS INDEX DOCD: CPT | Performed by: INTERNAL MEDICINE

## 2023-08-01 PROCEDURE — 99497 ADVNCD CARE PLAN 30 MIN: CPT | Performed by: INTERNAL MEDICINE

## 2023-08-01 PROCEDURE — 1170F FXNL STATUS ASSESSED: CPT | Performed by: INTERNAL MEDICINE

## 2023-08-01 PROCEDURE — 99397 PER PM REEVAL EST PAT 65+ YR: CPT | Performed by: INTERNAL MEDICINE

## 2023-08-01 PROCEDURE — 1160F RVW MEDS BY RX/DR IN RCRD: CPT | Performed by: INTERNAL MEDICINE

## 2023-08-01 PROCEDURE — G0444 DEPRESSION SCREEN ANNUAL: HCPCS | Performed by: INTERNAL MEDICINE

## 2023-08-01 PROCEDURE — 20610 DRAIN/INJ JOINT/BURSA W/O US: CPT | Performed by: INTERNAL MEDICINE

## 2023-08-01 PROCEDURE — 1036F TOBACCO NON-USER: CPT | Performed by: INTERNAL MEDICINE

## 2023-08-01 RX ORDER — TRIAMCINOLONE ACETONIDE 40 MG/ML
40 INJECTION, SUSPENSION INTRA-ARTICULAR; INTRAMUSCULAR
Status: COMPLETED | OUTPATIENT
Start: 2023-08-01 | End: 2023-08-01

## 2023-08-01 RX ORDER — LIDOCAINE HYDROCHLORIDE 10 MG/ML
8 INJECTION INFILTRATION; PERINEURAL
Status: COMPLETED | OUTPATIENT
Start: 2023-08-01 | End: 2023-08-01

## 2023-08-01 RX ADMIN — LIDOCAINE HYDROCHLORIDE 8 ML: 10 INJECTION INFILTRATION; PERINEURAL at 12:09

## 2023-08-01 RX ADMIN — TRIAMCINOLONE ACETONIDE 40 MG: 40 INJECTION, SUSPENSION INTRA-ARTICULAR; INTRAMUSCULAR at 12:09

## 2023-08-01 ASSESSMENT — ACTIVITIES OF DAILY LIVING (ADL)
DOING_HOUSEWORK: INDEPENDENT
BATHING: INDEPENDENT
GROCERY_SHOPPING: INDEPENDENT
MANAGING_FINANCES: INDEPENDENT
TAKING_MEDICATION: INDEPENDENT
DRESSING: INDEPENDENT

## 2023-08-01 ASSESSMENT — ENCOUNTER SYMPTOMS
LOSS OF SENSATION IN FEET: 0
OCCASIONAL FEELINGS OF UNSTEADINESS: 0
DEPRESSION: 0

## 2023-08-01 ASSESSMENT — ANXIETY QUESTIONNAIRES
1. FEELING NERVOUS, ANXIOUS, OR ON EDGE: NOT AT ALL
7. FEELING AFRAID AS IF SOMETHING AWFUL MIGHT HAPPEN: NOT AT ALL
3. WORRYING TOO MUCH ABOUT DIFFERENT THINGS: NOT AT ALL
6. BECOMING EASILY ANNOYED OR IRRITABLE: NOT AT ALL
2. NOT BEING ABLE TO STOP OR CONTROL WORRYING: NOT AT ALL
4. TROUBLE RELAXING: NOT AT ALL
GAD7 TOTAL SCORE: 0
5. BEING SO RESTLESS THAT IT IS HARD TO SIT STILL: NOT AT ALL

## 2023-08-01 ASSESSMENT — PATIENT HEALTH QUESTIONNAIRE - PHQ9
SUM OF ALL RESPONSES TO PHQ9 QUESTIONS 1 AND 2: 0
2. FEELING DOWN, DEPRESSED OR HOPELESS: NOT AT ALL
1. LITTLE INTEREST OR PLEASURE IN DOING THINGS: NOT AT ALL

## 2023-08-01 NOTE — PROGRESS NOTES
"Subjective   Reason for Visit: Jenna Ca is an 67 y.o. female here for a Medicare Wellness visit.   Left Shoulder injection    Past Medical, Surgical, and Family History reviewed and updated in chart.    Reviewed all medications by prescribing practitioner or clinical pharmacist (such as prescriptions, OTCs, herbal therapies and supplements) and documented in the medical record.    HPI    Patient Care Team:  Jared Rojo DO as PCP - General  Miguelito Mota MD PhD as PCP - [employer] PCP     Review of Systems    Objective   Vitals:  /74 (BP Location: Left arm, Patient Position: Sitting)   Pulse 68   Ht 1.664 m (5' 5.5\")   Wt 81.6 kg (180 lb)   BMI 29.50 kg/m²       Physical Exam    Assessment/Plan   Problem List Items Addressed This Visit    None         "

## 2023-08-01 NOTE — PROGRESS NOTES
Subjective   Reason for Visit: Jenna Ca is an 67 y.o. female here for a Medicare Wellness visit.     Past Medical, Surgical, and Family History reviewed and updated in chart.    Reviewed all medications by prescribing practitioner or clinical pharmacist (such as prescriptions, OTCs, herbal therapies and supplements) and documented in the medical record.    HPI    Patient Care Team:  Jared Rojo DO as PCP - General  Miguelito Mota MD PhD as PCP - [employer] PCP     Review of Systems   All other systems reviewed and are negative.  Patient ID: Jenna Ca is a 67 y.o. female.    Joint Injection Large/Arthrocentesis: L subacromial bursa on 8/1/2023 12:09 PM  Indications: pain  Details: 25 G needle, posterior approach  Medications: 40 mg triamcinolone acetonide 40 mg/mL; 8 mL lidocaine 10 mg/mL (1 %)  Outcome: tolerated well, no immediate complications  Procedure, treatment alternatives, risks and benefits explained, specific risks discussed. Consent was given by the patient. Immediately prior to procedure a time out was called to verify the correct patient, procedure, equipment, support staff and site/side marked as required. Patient was prepped and draped in the usual sterile fashion.       Alcohol consumption becomes hazardous when consuming women [place] over 65 years old greater than 7 drinks per week or greater than 3 drinks per occasion for men greater than 14 drinks per week or greater than 4 drinks per occasion.  I spent 15 minutes screening for alcohol use.Depression and anxiety screening completed   PHQ9 score   GAD7 score   I spent 15 minutes obtaining and discussing depression screening using PHQ 2 questions with results documented in chart.  Screening using PHQ-9 and DARY-7 scores were used for follow-up with treatment and referral plan discussed.      I spent greater than 15 minutes discussing advance care planning including the explanation and discussion of advanced  "directives.  If patient does not have current up-to-date documents examples and information provided on how to create both living will and power of .  toolkit was given to patient and was encouraged to work out completing these documents.    Objective   Vitals:  /74 (BP Location: Left arm, Patient Position: Sitting)   Pulse 68   Ht 1.664 m (5' 5.5\")   Wt 81.6 kg (180 lb)   BMI 29.50 kg/m²       Physical Exam  Vitals and nursing note reviewed.   Constitutional:       General: She is not in acute distress.     Appearance: Normal appearance. She is well-developed. She is not toxic-appearing.   HENT:      Head: Normocephalic and atraumatic.      Right Ear: Tympanic membrane and external ear normal.      Left Ear: Tympanic membrane and external ear normal.      Nose: Nose normal.      Mouth/Throat:      Mouth: Mucous membranes are moist.      Pharynx: Oropharynx is clear. No oropharyngeal exudate or posterior oropharyngeal erythema.      Tonsils: No tonsillar exudate. 2+ on the right. 2+ on the left.   Eyes:      Extraocular Movements: Extraocular movements intact.      Conjunctiva/sclera: Conjunctivae normal.   Cardiovascular:      Rate and Rhythm: Normal rate and regular rhythm.      Pulses: Normal pulses.      Heart sounds: Normal heart sounds. No murmur heard.  Pulmonary:      Effort: Pulmonary effort is normal.      Breath sounds: Normal breath sounds.   Abdominal:      General: Abdomen is flat. Bowel sounds are normal.      Palpations: Abdomen is soft.   Musculoskeletal:      Cervical back: Neck supple.   Lymphadenopathy:      Cervical: No cervical adenopathy.   Skin:     General: Skin is warm and dry.      Findings: No rash.   Neurological:      Mental Status: She is alert. Mental status is at baseline.   Psychiatric:         Mood and Affect: Mood normal.         Behavior: Behavior normal.         Thought Content: Thought content normal.         Judgment: Judgment normal. "         Assessment/Plan   Problem List Items Addressed This Visit       Irritable bowel syndrome with diarrhea    Osteoarthritis of shoulders due to rotator cuff injury, bilateral    Medicare annual wellness visit, subsequent     Other Visit Diagnoses       Routine general medical examination at health care facility    -  Primary

## 2023-08-01 NOTE — PROGRESS NOTES
"Subjective   Patient ID: Jenna Ca is a 67 y.o. female who presents for Follow-up and shoulder injection left shoulder.    HPI     Review of Systems    Objective   /74 (BP Location: Left arm, Patient Position: Sitting)   Pulse 68   Ht 1.664 m (5' 5.5\")   Wt 81.6 kg (180 lb)   BMI 29.50 kg/m²     Physical Exam    Assessment/Plan          "

## 2023-08-07 ENCOUNTER — OFFICE VISIT (OUTPATIENT)
Dept: PRIMARY CARE | Facility: CLINIC | Age: 68
End: 2023-08-07
Payer: MEDICARE

## 2023-08-07 VITALS — SYSTOLIC BLOOD PRESSURE: 130 MMHG | DIASTOLIC BLOOD PRESSURE: 80 MMHG

## 2023-08-07 DIAGNOSIS — F11.20 UNCOMPLICATED OPIOID DEPENDENCE (MULTI): Primary | ICD-10-CM

## 2023-08-07 DIAGNOSIS — Z79.899 PRESCRIPTION DRUG STARTED: ICD-10-CM

## 2023-08-07 PROCEDURE — 1036F TOBACCO NON-USER: CPT | Performed by: FAMILY MEDICINE

## 2023-08-07 PROCEDURE — 3008F BODY MASS INDEX DOCD: CPT | Performed by: FAMILY MEDICINE

## 2023-08-07 PROCEDURE — 3079F DIAST BP 80-89 MM HG: CPT | Performed by: FAMILY MEDICINE

## 2023-08-07 PROCEDURE — 99214 OFFICE O/P EST MOD 30 MIN: CPT | Performed by: FAMILY MEDICINE

## 2023-08-07 PROCEDURE — 1159F MED LIST DOCD IN RCRD: CPT | Performed by: FAMILY MEDICINE

## 2023-08-07 PROCEDURE — 3075F SYST BP GE 130 - 139MM HG: CPT | Performed by: FAMILY MEDICINE

## 2023-08-07 PROCEDURE — 1160F RVW MEDS BY RX/DR IN RCRD: CPT | Performed by: FAMILY MEDICINE

## 2023-08-07 PROCEDURE — 80305 DRUG TEST PRSMV DIR OPT OBS: CPT | Performed by: FAMILY MEDICINE

## 2023-08-07 RX ORDER — BUPRENORPHINE AND NALOXONE 8; 2 MG/1; MG/1
1 FILM, SOLUBLE BUCCAL; SUBLINGUAL DAILY
Qty: 28 FILM | Refills: 0 | Status: SHIPPED | OUTPATIENT
Start: 2023-08-07 | End: 2023-09-01 | Stop reason: SDUPTHER

## 2023-08-07 NOTE — PROGRESS NOTES
Subjective   Patient ID: Jenna Ca is a 67 y.o. female who presents for Opioid dependence treatment      HPI   Pt denies cravings for opioids and withdrawals symptoms with current dose of buprenorphine tx. Pt denies abusing any opioids. Patient has been attending meeting/counseling as recommended. Pt felt  functional with buprenorphine tx. Pt had good support. Pt denies drinking eoth. No headache, constipation, insomnia  or confusion. Good appetite. Pt would have cravings and withdrawals without buprenorphine treatment. pt denies having depressed mood.     Review of Systems    Objective   /80     Physical Exam  No acute distress, well groomed, eyes: normal pupil size,  Lungs: CTA bilaterally, heart: RRR, abdomen: soft, no tenderness, BS+, good balance. good mood     Assessment/Plan        Opioid dependence, Pt denies any opioid abuse. Cravings and withdrawals were controlled.  Pt has been attending counseling/NA/AA meeting for opioid dependence.  I reviewed pt's OARRS report today. The OARRS report showed pt filled medications as prescribed. I reviewed pt's UDS done in office today. It was negative for opioid.  Inform pt to avoid  alcohol  while on buprenorphine tx. I have considered the risks of abuse, dependence, addiction and diversion. I believe that it is clinically appropriate for the pt to continue current buprenorphine treatment. Continue counseling for opioid dependence. Inform pt that lending buprenorphine is illegal. Recommend to keep narcan available in case of opioid OD. Pt should not drive or operate machinery if incoordination or confusion occurs. f/u as scheduled

## 2023-08-24 ENCOUNTER — TELEPHONE (OUTPATIENT)
Dept: PRIMARY CARE | Facility: CLINIC | Age: 68
End: 2023-08-24
Payer: MEDICARE

## 2023-08-24 NOTE — TELEPHONE ENCOUNTER
Patient was started on Vitamin D taking it twice a week. She started to experiencing jaw/neck pain in the muscles along with pain in the throat and under her ear. Patient stated she took a dose of the vitamin d 2.5 days ago and the next day all symptoms started to come back. Please advise

## 2023-09-01 ENCOUNTER — OFFICE VISIT (OUTPATIENT)
Dept: PRIMARY CARE | Facility: CLINIC | Age: 68
End: 2023-09-01
Payer: MEDICARE

## 2023-09-01 VITALS — SYSTOLIC BLOOD PRESSURE: 122 MMHG | DIASTOLIC BLOOD PRESSURE: 68 MMHG

## 2023-09-01 DIAGNOSIS — F11.20 UNCOMPLICATED OPIOID DEPENDENCE (MULTI): ICD-10-CM

## 2023-09-01 PROCEDURE — 1036F TOBACCO NON-USER: CPT | Performed by: FAMILY MEDICINE

## 2023-09-01 PROCEDURE — 3008F BODY MASS INDEX DOCD: CPT | Performed by: FAMILY MEDICINE

## 2023-09-01 PROCEDURE — 90686 IIV4 VACC NO PRSV 0.5 ML IM: CPT | Performed by: FAMILY MEDICINE

## 2023-09-01 PROCEDURE — G0008 ADMIN INFLUENZA VIRUS VAC: HCPCS | Performed by: FAMILY MEDICINE

## 2023-09-01 PROCEDURE — 1160F RVW MEDS BY RX/DR IN RCRD: CPT | Performed by: FAMILY MEDICINE

## 2023-09-01 PROCEDURE — 99213 OFFICE O/P EST LOW 20 MIN: CPT | Performed by: FAMILY MEDICINE

## 2023-09-01 PROCEDURE — 1159F MED LIST DOCD IN RCRD: CPT | Performed by: FAMILY MEDICINE

## 2023-09-01 PROCEDURE — 3078F DIAST BP <80 MM HG: CPT | Performed by: FAMILY MEDICINE

## 2023-09-01 PROCEDURE — 3074F SYST BP LT 130 MM HG: CPT | Performed by: FAMILY MEDICINE

## 2023-09-01 RX ORDER — BUPRENORPHINE AND NALOXONE 8; 2 MG/1; MG/1
1 FILM, SOLUBLE BUCCAL; SUBLINGUAL DAILY
Qty: 28 FILM | Refills: 0 | Status: SHIPPED | OUTPATIENT
Start: 2023-09-01 | End: 2023-09-29 | Stop reason: SDUPTHER

## 2023-09-01 NOTE — PROGRESS NOTES
Subjective   Patient ID: Jenna Ca is a 67 y.o. female who presents for Opioid dependence treatment    HPI   Pt stated that current dose of buprenorphine controlled cravings for opioids and withdrawals symptoms well. Pt feels normal and performs daily activities well.  Pt denies abusing any opioids. pt has social support and has been having counseling. pt denies drinking alcohol. No SE from buprenorphine  such as HA, constipation, imbalance or confusion. Pt sleeps well most night. Pt would have cravings and withdrawals without buprenorphine treatment. Pt denies depressed mood. No HI/SI.     Review of Systems    Objective   /68     Physical Exam  No acute distress, well groomed, eyes: no sclera icterus, no exertional respiration, Lungs: CTA bilaterally, heart: RRR, abdomen: soft, no tenderness, BS+, normal station and gait, normal mood and affect. No HI/SI     Assessment/Plan     Opioid dependence, Pt stated that current dose of buprenorphine treatment controlled cravings and withdrawals well. Pt denies any opioid abuse.  I reviewed pt's updated OARRS report today.  The OARRS report showed that pt filled buprenorphine consistently as prescribed. Caution patient that alcohol  should be avoided while on buprenorphine. Pt understood that buprenorphine is addictive. Inform pt that lending meds is illegal. Recommend pt to lock  buprenorphine in a safe place and  out of reach of children. Recommend to keep naloxone available in case of opioid OD. Cont counseling for opioid dependence. Pt should not drive or operate machinery if incoordination or confusion occurs. I have considered the risks of abuse, dependence, addiction and diversion. I believe that it is clinically appropriate for the pt to continue current buprenorphine treatment. RTC as scheduled

## 2023-09-20 DIAGNOSIS — K21.9 GASTRO-ESOPHAGEAL REFLUX DISEASE WITHOUT ESOPHAGITIS: ICD-10-CM

## 2023-09-20 RX ORDER — PANTOPRAZOLE SODIUM 40 MG/1
40 TABLET, DELAYED RELEASE ORAL 2 TIMES DAILY
Qty: 180 TABLET | Refills: 3 | Status: SHIPPED | OUTPATIENT
Start: 2023-09-20

## 2023-09-29 ENCOUNTER — OFFICE VISIT (OUTPATIENT)
Dept: PRIMARY CARE | Facility: CLINIC | Age: 68
End: 2023-09-29
Payer: MEDICARE

## 2023-09-29 VITALS — DIASTOLIC BLOOD PRESSURE: 81 MMHG | SYSTOLIC BLOOD PRESSURE: 127 MMHG

## 2023-09-29 DIAGNOSIS — F11.20 UNCOMPLICATED OPIOID DEPENDENCE (MULTI): ICD-10-CM

## 2023-09-29 DIAGNOSIS — Z79.899 PRESCRIPTION DRUG STARTED: Primary | ICD-10-CM

## 2023-09-29 PROCEDURE — 3079F DIAST BP 80-89 MM HG: CPT | Performed by: FAMILY MEDICINE

## 2023-09-29 PROCEDURE — 99214 OFFICE O/P EST MOD 30 MIN: CPT | Performed by: FAMILY MEDICINE

## 2023-09-29 PROCEDURE — 80305 DRUG TEST PRSMV DIR OPT OBS: CPT | Performed by: FAMILY MEDICINE

## 2023-09-29 PROCEDURE — 1160F RVW MEDS BY RX/DR IN RCRD: CPT | Performed by: FAMILY MEDICINE

## 2023-09-29 PROCEDURE — 1036F TOBACCO NON-USER: CPT | Performed by: FAMILY MEDICINE

## 2023-09-29 PROCEDURE — 1159F MED LIST DOCD IN RCRD: CPT | Performed by: FAMILY MEDICINE

## 2023-09-29 PROCEDURE — 3008F BODY MASS INDEX DOCD: CPT | Performed by: FAMILY MEDICINE

## 2023-09-29 PROCEDURE — 3074F SYST BP LT 130 MM HG: CPT | Performed by: FAMILY MEDICINE

## 2023-09-29 RX ORDER — BUPRENORPHINE AND NALOXONE 8; 2 MG/1; MG/1
1 FILM, SOLUBLE BUCCAL; SUBLINGUAL DAILY
Qty: 28 FILM | Refills: 0 | Status: SHIPPED | OUTPATIENT
Start: 2023-09-29 | End: 2023-10-27 | Stop reason: SDUPTHER

## 2023-09-29 NOTE — PROGRESS NOTES
Subjective   Patient ID: Jenna Ca is a 67 y.o. female who presents for Opioid dependence treatment      HPI   pt denies cravings for opioids and withdrawals symptoms with buprenorphine tx. pt felt functional at daily activities. No headache, confusion, drowsiness, imbalance, insomnia or constipation. Pt denies abusing any opioids.  Pt denies drinking eoth.  pt has been attending counseling and/or NA/AA meeting. Pt has good family support. Pt would have cravings and withdrawals without buprenorphine treatment. pt denies depressed mood, mood swings or HI/SI.     Review of Systems    Objective   /81     Physical Exam  No acute distress, well groomed, eyes: normal pupil size, no sclera icterus, no nasal discharge, no exertional respiration, Lungs: CTA bilaterally, heart: RRR, abdomen: soft, no tenderness, BS+, good balance, good memory and judgment. No depressed mood     Assessment/Plan        Opioid dependence, Pt denies any opioid abuse. Cravings and withdrawals were controlled.     I personally reviewed pt's urine drug screen test today.  The urine drug screen was negative for opiate. I reviewed pt's OARRS report today. The OARRS report showed pt filled meds as prescribed.  No physician shopping.   Inform pt that counseling and/or NA/AA meeting are mandatory for the buprenorphine treatment program.  Inform pt that buprenorphine is addictive and that buprenorphine diversion is against the law. Pt was familiar to the signs and symptoms of opioid overdose and will call 911 in an overdose situation. Pt has narcan readily available in case of opioid OD. Pt agreed to ask for a new prescription for narcan upon expiration or use of the old narcan kit. Continue counseling and  buprenorphine tx for opioid dependence. Recommend pt to lock  buprenorphine in a secure  location. I have considered the risks of abuse, dependence, addiction and diversion. I believe that it is clinically appropriate for the pt to  continue current buprenorphine treatment. f/u as scheduled

## 2023-10-27 ENCOUNTER — OFFICE VISIT (OUTPATIENT)
Dept: PRIMARY CARE | Facility: CLINIC | Age: 68
End: 2023-10-27
Payer: MEDICARE

## 2023-10-27 VITALS — SYSTOLIC BLOOD PRESSURE: 125 MMHG | HEART RATE: 76 BPM | DIASTOLIC BLOOD PRESSURE: 76 MMHG

## 2023-10-27 DIAGNOSIS — F11.20 UNCOMPLICATED OPIOID DEPENDENCE (MULTI): ICD-10-CM

## 2023-10-27 PROCEDURE — 3074F SYST BP LT 130 MM HG: CPT | Performed by: FAMILY MEDICINE

## 2023-10-27 PROCEDURE — 1159F MED LIST DOCD IN RCRD: CPT | Performed by: FAMILY MEDICINE

## 2023-10-27 PROCEDURE — 3008F BODY MASS INDEX DOCD: CPT | Performed by: FAMILY MEDICINE

## 2023-10-27 PROCEDURE — 1160F RVW MEDS BY RX/DR IN RCRD: CPT | Performed by: FAMILY MEDICINE

## 2023-10-27 PROCEDURE — 3078F DIAST BP <80 MM HG: CPT | Performed by: FAMILY MEDICINE

## 2023-10-27 PROCEDURE — 99213 OFFICE O/P EST LOW 20 MIN: CPT | Performed by: FAMILY MEDICINE

## 2023-10-27 PROCEDURE — 1036F TOBACCO NON-USER: CPT | Performed by: FAMILY MEDICINE

## 2023-10-27 RX ORDER — BUPRENORPHINE AND NALOXONE 8; 2 MG/1; MG/1
1 FILM, SOLUBLE BUCCAL; SUBLINGUAL DAILY
Qty: 28 FILM | Refills: 0 | Status: SHIPPED | OUTPATIENT
Start: 2023-10-27 | End: 2023-11-22 | Stop reason: SDUPTHER

## 2023-10-27 NOTE — PROGRESS NOTES
Subjective   Patient ID: Jenna Ca is a 67 y.o. female who presents for Opioid dependence treatment    HPI   Pt stated that cravings for opioids and withdrawals symptoms were controlled with buprenorphine. Pt has been taking buprenorphine as instructed. Pt denies abusing any opioids.  Pt sleeps well most nights. pt has been attending counseling and/or NA/AA meeting. Pt denies drinking eoth. Pt denies having HA, constipation, imbalance or confusion. Pt denies mental cloudiness, drowsiness.  Pt felt functional while on current buprenorphine treatment. Pt may abuse opioids again without buprenorphine treatment due to cravings and withdrawals. Pt denies depressed mood     Review of Systems    Objective   /76   Pulse 76     Physical Exam  No acute distress, well groomed, eyes: no sclera icterus, normal pupil size, neck is supple, Lungs: CTA bilaterally, heart: RRR, abdomen: soft, no tenderness, normal BS, good balance, good judgment and insight. Good mood   Assessment/Plan     Opioid dependence, Pt denies any opioid abuse. Cravings and withdrawals were controlled.   I reviewed pt's updated OARRS report today.  The OARRS report showed that pt filled buprenorphine as prescribed. No sign of physician shopping. The patient was informed that Eoth and benzo should be avoided while on buprenorphine. pt understood that diversion of buprenorphine is illegal. Recommend to taper down the dose of buprenorphine when pt feels comfortable. Recommend pt to keep buprenorphine in a secure place and out of reach of children. Recommend to keep naloxone kits available in case of opioid OD. Continue counseling for opioid dependence. Pt should not drive or operate machinery if incoordination or confusion occurs. I have considered the risks of abuse, dependence, addiction and diversion. I believe that it is clinically appropriate for the pt to continue buprenorphine treatment. Pt is familiar to the signs and symptoms of opioid  overdose and pt knows how to use narcan in case of emergency. f/u as scheduled

## 2023-11-22 ENCOUNTER — OFFICE VISIT (OUTPATIENT)
Dept: PRIMARY CARE | Facility: CLINIC | Age: 68
End: 2023-11-22
Payer: MEDICARE

## 2023-11-22 VITALS — SYSTOLIC BLOOD PRESSURE: 121 MMHG | DIASTOLIC BLOOD PRESSURE: 69 MMHG

## 2023-11-22 DIAGNOSIS — F11.20 UNCOMPLICATED OPIOID DEPENDENCE (MULTI): ICD-10-CM

## 2023-11-22 PROCEDURE — 3078F DIAST BP <80 MM HG: CPT | Performed by: FAMILY MEDICINE

## 2023-11-22 PROCEDURE — 1160F RVW MEDS BY RX/DR IN RCRD: CPT | Performed by: FAMILY MEDICINE

## 2023-11-22 PROCEDURE — 3074F SYST BP LT 130 MM HG: CPT | Performed by: FAMILY MEDICINE

## 2023-11-22 PROCEDURE — 99214 OFFICE O/P EST MOD 30 MIN: CPT | Performed by: FAMILY MEDICINE

## 2023-11-22 PROCEDURE — 80305 DRUG TEST PRSMV DIR OPT OBS: CPT | Performed by: FAMILY MEDICINE

## 2023-11-22 PROCEDURE — 3008F BODY MASS INDEX DOCD: CPT | Performed by: FAMILY MEDICINE

## 2023-11-22 PROCEDURE — 1159F MED LIST DOCD IN RCRD: CPT | Performed by: FAMILY MEDICINE

## 2023-11-22 PROCEDURE — 1036F TOBACCO NON-USER: CPT | Performed by: FAMILY MEDICINE

## 2023-11-22 RX ORDER — BUPRENORPHINE AND NALOXONE 8; 2 MG/1; MG/1
1 FILM, SOLUBLE BUCCAL; SUBLINGUAL DAILY
Qty: 28 FILM | Refills: 0 | Status: SHIPPED | OUTPATIENT
Start: 2023-11-22 | End: 2023-12-22 | Stop reason: SDUPTHER

## 2023-11-22 NOTE — PROGRESS NOTES
Subjective   Patient ID: Jenna Ca is a 67 y.o. female who presents for Opioid dependence treatment    HPI   Pt performs daily activities well with current buprenorphine treatment.  Cravings for opioids and withdrawals symptoms were controlled.  Pt denies abusing any opioids.Pt denies drinking eoth. Pt has been attending meeting or counseling for opioid dependence. Pt has been taking buprenorphine as dir. Pt denies having headache, constipation, mental cloudiness, fatigue, insomnia, drowsiness. Pt would have cravings and withdrawals without buprenorphine treatment. Pt has had good mood. No HI/SI.     Review of Systems    Objective   /69     Physical Exam  No acute distress, well groomed, eyes: normal pupil size, no sclera icterus, normal respiration effort. Lungs: CTA bilaterally, heart: RRR, abdomen: soft, no tenderness, normal BS, Good mood and normal affect. No HI/SI    Assessment/Plan        Opioid dependence, Pt denies any opioid abuse. Cravings for opioids and withdrawals were controlled.    I personally reviewed pt's urine drug screen test performed in office today.  The urine drug screen was negative for opiate. I reviewed pt's updated OARRS report.  The OARRS report showed that pt filled buprenorphine as prescribed. No opioid shopping.   Pt understood to avoid use  Eoth while on buprenorphine due to potential interactions causing death. Inform pt that buprenorphine is addictive. Inform pt that selling or giving away buprenorphine is against the law. Recommend pt to keep buprenorphine out of reach of children. Recommend to keep naloxone kits available in case of opioid OD. Recommend   counseling for opioid dependence. Pt should not drive or operate machinery if incoordination or confusion occurs. I have considered the risks of abuse, dependence, addiction and diversion. I believe that it is clinically appropriate for the pt to continue buprenorphine treatment. Caution patient that transmucosal  buprenorphine can increase the risk of tooth decay, cavities, oral infections, and loss of teeth. Recommend the pt to see a dentist for dental exam regularly. Recommend pt to gently rinse teeth and gums with water after the medicine has completely dissolved. Informed pt the alternative medical treatments for opioid dependence such as Probuphine implant, SUBLOCADE injection, vivitrol shot, in-patient rehabilitation are available. f/u as scheduled.

## 2023-11-24 ENCOUNTER — APPOINTMENT (OUTPATIENT)
Dept: PRIMARY CARE | Facility: CLINIC | Age: 68
End: 2023-11-24
Payer: MEDICARE

## 2023-12-20 DIAGNOSIS — K64.9 UNSPECIFIED HEMORRHOIDS: ICD-10-CM

## 2023-12-20 RX ORDER — HYDROCORTISONE 25 MG/G
CREAM TOPICAL
Qty: 30 G | Refills: 6 | Status: SHIPPED | OUTPATIENT
Start: 2023-12-20

## 2023-12-22 ENCOUNTER — OFFICE VISIT (OUTPATIENT)
Dept: PRIMARY CARE | Facility: CLINIC | Age: 68
End: 2023-12-22
Payer: MEDICARE

## 2023-12-22 VITALS — DIASTOLIC BLOOD PRESSURE: 78 MMHG | SYSTOLIC BLOOD PRESSURE: 126 MMHG

## 2023-12-22 DIAGNOSIS — F11.20 UNCOMPLICATED OPIOID DEPENDENCE (MULTI): Primary | ICD-10-CM

## 2023-12-22 DIAGNOSIS — F11.20 UNCOMPLICATED OPIOID DEPENDENCE (MULTI): ICD-10-CM

## 2023-12-22 DIAGNOSIS — F33.1 MODERATE EPISODE OF RECURRENT MAJOR DEPRESSIVE DISORDER (MULTI): ICD-10-CM

## 2023-12-22 PROCEDURE — 1159F MED LIST DOCD IN RCRD: CPT | Performed by: FAMILY MEDICINE

## 2023-12-22 PROCEDURE — 1036F TOBACCO NON-USER: CPT | Performed by: FAMILY MEDICINE

## 2023-12-22 PROCEDURE — 3078F DIAST BP <80 MM HG: CPT | Performed by: FAMILY MEDICINE

## 2023-12-22 PROCEDURE — 1160F RVW MEDS BY RX/DR IN RCRD: CPT | Performed by: FAMILY MEDICINE

## 2023-12-22 PROCEDURE — 80305 DRUG TEST PRSMV DIR OPT OBS: CPT | Performed by: FAMILY MEDICINE

## 2023-12-22 PROCEDURE — 99214 OFFICE O/P EST MOD 30 MIN: CPT | Performed by: FAMILY MEDICINE

## 2023-12-22 PROCEDURE — 3074F SYST BP LT 130 MM HG: CPT | Performed by: FAMILY MEDICINE

## 2023-12-22 PROCEDURE — 3008F BODY MASS INDEX DOCD: CPT | Performed by: FAMILY MEDICINE

## 2023-12-22 RX ORDER — BUPRENORPHINE AND NALOXONE 8; 2 MG/1; MG/1
1 FILM, SOLUBLE BUCCAL; SUBLINGUAL DAILY
Qty: 28 FILM | Refills: 0 | Status: SHIPPED | OUTPATIENT
Start: 2023-12-22 | End: 2024-01-19 | Stop reason: SDUPTHER

## 2023-12-22 RX ORDER — BUPRENORPHINE AND NALOXONE 8; 2 MG/1; MG/1
1 FILM, SOLUBLE BUCCAL; SUBLINGUAL DAILY
Qty: 28 FILM | Refills: 0 | Status: SHIPPED | OUTPATIENT
Start: 2023-12-22 | End: 2023-12-22 | Stop reason: SDUPTHER

## 2023-12-22 NOTE — PROGRESS NOTES
Chief complaint: Opioid dependence treatment    HPI: Pt denies having cravings for opioids or withdrawals symptoms.  Pt denies abusing any opioids. Pt denies drinking eoth. pt has been attending NA/AA meeting. pt has been feeling functional with current dose of buprenorphine tx. Pt was able to perform daily activities well. Pt would have cravings for opioids and may abuse opioids again without buprenorphine treatment. No depressed mood or HI/SI. Normal BM. No drowsiness, headache, imbalance or confusion. Pt sleeps well most nights.   PE: NAD, Well groomed, eyes: Normal pupil size, no sclera icterus, no nasal discharge, no respiratory exertion, Lungs: CTA bilaterally, heart: RRR, abdomen: soft, no tenderness, BS+, normal gait and station, good judgment and insight,  good mood and normal affect. No HI/SI  A/P: Opioid dependence, Pt denies having cravings or withdrawals. Pt denies any opioid abuse. Caution patient that transmucosal buprenorphine can increase the risk of  dental/gum problems, including tooth decay, cavities, oral infections, and loss of teeth. Recommend  the pt to see a dentist regularly. Recommend pt to gently rinse teeth and gums with water after the medicine has completely dissolved.  Wait at least 1 hour before brushing teeth. Informed pt the alternative medical treatment for opioid dependence such as Probuphine implant, SUBLOCADE injection, vivitrol shot, oral naltrexone pills, in-patient rehabilitation.    I personally reviewed pt's UDS performed in office today.  The UDS was negative for opiate. I reviewed pt's OARRS report today.  The OARRS report showed that pt filled buprenorphine consistently as prescribed. Pt has not filled any other opioid medications recently. No indication of physician shopping. Caution pt that alcohol and benzodiazepine can interact with buprenorphine causing potential death and should be avoided while on buprenorphine.  Pt understood that buprenorphine can be addictive  too. Inform pt that buprenorphine diversion is illegal.  Recommend pt to keep Buprenorphine in a locked place and to keep it out reach of children. Recommend to keep naloxone available in case of opioid OD. Inform pt to keep naloxone readily available in case of opioid OD. Encourage the mandatory counseling. Pt should not drive or operate machinery if incoordination or confusion occurs. I have considered the risks of abuse, dependence, addiction and diversion. I believe that it is clinically appropriate for the pt to continue buprenorphine treatment. RTC as scheduled  Depression, well controlled with paxil. No HI/SI. Cont. the same.

## 2024-01-07 DIAGNOSIS — M54.12 RADICULOPATHY, CERVICAL REGION: ICD-10-CM

## 2024-01-08 RX ORDER — GABAPENTIN 600 MG/1
600 TABLET ORAL 3 TIMES DAILY
Qty: 270 TABLET | Refills: 3 | Status: SHIPPED | OUTPATIENT
Start: 2024-01-08

## 2024-01-19 ENCOUNTER — OFFICE VISIT (OUTPATIENT)
Dept: PRIMARY CARE | Facility: CLINIC | Age: 69
End: 2024-01-19
Payer: MEDICARE

## 2024-01-19 VITALS — DIASTOLIC BLOOD PRESSURE: 72 MMHG | SYSTOLIC BLOOD PRESSURE: 132 MMHG

## 2024-01-19 DIAGNOSIS — F11.20 UNCOMPLICATED OPIOID DEPENDENCE (MULTI): ICD-10-CM

## 2024-01-19 PROCEDURE — 3075F SYST BP GE 130 - 139MM HG: CPT | Performed by: FAMILY MEDICINE

## 2024-01-19 PROCEDURE — 3078F DIAST BP <80 MM HG: CPT | Performed by: FAMILY MEDICINE

## 2024-01-19 PROCEDURE — 80305 DRUG TEST PRSMV DIR OPT OBS: CPT | Performed by: FAMILY MEDICINE

## 2024-01-19 PROCEDURE — 1160F RVW MEDS BY RX/DR IN RCRD: CPT | Performed by: FAMILY MEDICINE

## 2024-01-19 PROCEDURE — 99214 OFFICE O/P EST MOD 30 MIN: CPT | Performed by: FAMILY MEDICINE

## 2024-01-19 PROCEDURE — 1036F TOBACCO NON-USER: CPT | Performed by: FAMILY MEDICINE

## 2024-01-19 PROCEDURE — 3008F BODY MASS INDEX DOCD: CPT | Performed by: FAMILY MEDICINE

## 2024-01-19 RX ORDER — BUPRENORPHINE AND NALOXONE 8; 2 MG/1; MG/1
1 FILM, SOLUBLE BUCCAL; SUBLINGUAL DAILY
Qty: 28 FILM | Refills: 0 | Status: SHIPPED | OUTPATIENT
Start: 2024-01-19 | End: 2024-02-16 | Stop reason: SDUPTHER

## 2024-01-19 NOTE — PROGRESS NOTES
Chief complaint: Opioid dependence treatment    HPI: Pt felt functional at daily activities with buprenorphine tx. Pt denies having cravings for opioids or withdrawals symptoms. Pt denies abusing any opioids. Pt has been attending NA/AA meeting or counseling. No eoth use. No HA, constipation, imbalance or confusion. No insomnia. Pt would have cravings and withdrawals without buprenorphine treatment. No depressed mood. No HI/SI.   PE: No acute distress, well groomed, eyes: no sclera icterus, normal pupil size, no exertional respiration, Lungs: CTA bilaterally, heart: RRR, abdomen: soft, no tenderness, BS+,  good judgment, memory and insight, good mood. Normal affect. No HI/SI or paranoia  A/P: Opioid dependence, Pt denies cravings and withdrawals. Pt denies any opioid abuse. Pt felt functional with buprenorphine tx.  I personally reviewed pt's urine drug screen performed in office today.  The urine drug screen was negative for opiate. I reviewed pt's updated OARRS report today.  The OARRS report showed that pt filled buprenorphine as prescribed.  Recommend pt to gently rinse teeth and gums with water after the medicine has completely dissolved.  Pt understood to avoid alcohol and benzodiazepine which can interact with buprenorphine causing respiratory suppression and potential death. Pt understood that buprenorphine is addictive. Caution that buprenorphine diversion is against the law. Recommend pt to keep buprenorphine out of reach of children. Recommend to keep naloxone available in case of opioid OD. Encourage counseling for opioid dependence. Pt should not drive or operate machinery if incoordination or confusion occurs. I have considered the risks of abuse, dependence, addiction and diversion. I believe that it is clinically appropriate for the pt to continue current buprenorphine treatment. RTC as scheduled.

## 2024-01-25 ENCOUNTER — LAB (OUTPATIENT)
Dept: LAB | Facility: LAB | Age: 69
End: 2024-01-25
Payer: MEDICARE

## 2024-01-25 DIAGNOSIS — E78.5 DYSLIPIDEMIA, GOAL LDL BELOW 100: Primary | ICD-10-CM

## 2024-01-25 DIAGNOSIS — E55.9 VITAMIN D DEFICIENCY: ICD-10-CM

## 2024-01-25 DIAGNOSIS — M19.111 OSTEOARTHRITIS OF SHOULDERS DUE TO ROTATOR CUFF INJURY, BILATERAL: ICD-10-CM

## 2024-01-25 DIAGNOSIS — E78.5 DYSLIPIDEMIA, GOAL LDL BELOW 100: ICD-10-CM

## 2024-01-25 DIAGNOSIS — E78.5 DYSLIPIDEMIA: ICD-10-CM

## 2024-01-25 DIAGNOSIS — E66.09 CLASS 1 OBESITY DUE TO EXCESS CALORIES WITH SERIOUS COMORBIDITY AND BODY MASS INDEX (BMI) OF 30.0 TO 30.9 IN ADULT: ICD-10-CM

## 2024-01-25 DIAGNOSIS — F33.1 MODERATE EPISODE OF RECURRENT MAJOR DEPRESSIVE DISORDER (MULTI): ICD-10-CM

## 2024-01-25 DIAGNOSIS — S46.001S OSTEOARTHRITIS OF SHOULDERS DUE TO ROTATOR CUFF INJURY, BILATERAL: ICD-10-CM

## 2024-01-25 DIAGNOSIS — S46.002S OSTEOARTHRITIS OF SHOULDERS DUE TO ROTATOR CUFF INJURY, BILATERAL: ICD-10-CM

## 2024-01-25 DIAGNOSIS — R73.02 IGT (IMPAIRED GLUCOSE TOLERANCE): ICD-10-CM

## 2024-01-25 DIAGNOSIS — Z00.00 MEDICARE ANNUAL WELLNESS VISIT, SUBSEQUENT: ICD-10-CM

## 2024-01-25 DIAGNOSIS — M19.112 OSTEOARTHRITIS OF SHOULDERS DUE TO ROTATOR CUFF INJURY, BILATERAL: ICD-10-CM

## 2024-01-25 DIAGNOSIS — K58.0 IRRITABLE BOWEL SYNDROME WITH DIARRHEA: ICD-10-CM

## 2024-01-25 LAB
25(OH)D3 SERPL-MCNC: 12 NG/ML (ref 30–100)
ALBUMIN SERPL BCP-MCNC: 4.1 G/DL (ref 3.4–5)
ALP SERPL-CCNC: 71 U/L (ref 33–136)
ALT SERPL W P-5'-P-CCNC: 14 U/L (ref 7–45)
ANION GAP SERPL CALC-SCNC: 13 MMOL/L (ref 10–20)
AST SERPL W P-5'-P-CCNC: 18 U/L (ref 9–39)
BILIRUB SERPL-MCNC: 0.6 MG/DL (ref 0–1.2)
BUN SERPL-MCNC: 10 MG/DL (ref 6–23)
CALCIUM SERPL-MCNC: 9 MG/DL (ref 8.6–10.3)
CHLORIDE SERPL-SCNC: 108 MMOL/L (ref 98–107)
CHOLEST SERPL-MCNC: 241 MG/DL (ref 0–199)
CHOLESTEROL/HDL RATIO: 4.6
CO2 SERPL-SCNC: 24 MMOL/L (ref 21–32)
CREAT SERPL-MCNC: 0.8 MG/DL (ref 0.5–1.05)
CREAT UR-MCNC: 139.8 MG/DL (ref 20–320)
EGFRCR SERPLBLD CKD-EPI 2021: 80 ML/MIN/1.73M*2
GLUCOSE SERPL-MCNC: 92 MG/DL (ref 74–99)
HDLC SERPL-MCNC: 52.3 MG/DL
LDLC SERPL CALC-MCNC: 165 MG/DL
MICROALBUMIN UR-MCNC: 9.4 MG/L
MICROALBUMIN/CREAT UR: 6.7 UG/MG CREAT
NON HDL CHOLESTEROL: 189 MG/DL (ref 0–149)
POTASSIUM SERPL-SCNC: 3.8 MMOL/L (ref 3.5–5.3)
PROT SERPL-MCNC: 6.8 G/DL (ref 6.4–8.2)
SODIUM SERPL-SCNC: 141 MMOL/L (ref 136–145)
TRIGL SERPL-MCNC: 117 MG/DL (ref 0–149)
VLDL: 23 MG/DL (ref 0–40)

## 2024-01-25 PROCEDURE — 82306 VITAMIN D 25 HYDROXY: CPT

## 2024-01-25 PROCEDURE — 82043 UR ALBUMIN QUANTITATIVE: CPT

## 2024-01-25 PROCEDURE — 82570 ASSAY OF URINE CREATININE: CPT

## 2024-01-25 PROCEDURE — 80061 LIPID PANEL: CPT

## 2024-01-25 PROCEDURE — 80053 COMPREHEN METABOLIC PANEL: CPT

## 2024-01-25 PROCEDURE — 83036 HEMOGLOBIN GLYCOSYLATED A1C: CPT

## 2024-01-25 PROCEDURE — 36415 COLL VENOUS BLD VENIPUNCTURE: CPT

## 2024-01-25 RX ORDER — ROSUVASTATIN CALCIUM 10 MG/1
10 TABLET, COATED ORAL DAILY
Qty: 90 TABLET | Refills: 3 | Status: SHIPPED | OUTPATIENT
Start: 2024-01-25 | End: 2024-02-01 | Stop reason: WASHOUT

## 2024-01-25 RX ORDER — ERGOCALCIFEROL 1.25 MG/1
CAPSULE ORAL
Qty: 4 CAPSULE | Refills: 3 | Status: SHIPPED | OUTPATIENT
Start: 2024-01-25 | End: 2024-02-01 | Stop reason: WASHOUT

## 2024-01-26 ENCOUNTER — TELEPHONE (OUTPATIENT)
Dept: PRIMARY CARE | Facility: CLINIC | Age: 69
End: 2024-01-26
Payer: MEDICARE

## 2024-01-26 LAB
EST. AVERAGE GLUCOSE BLD GHB EST-MCNC: 120 MG/DL
HBA1C MFR BLD: 5.8 %

## 2024-01-26 NOTE — TELEPHONE ENCOUNTER
----- Message from Jared Rojo DO sent at 1/25/2024  6:24 PM EST -----  Reviewed lab work LDL cholesterol severely high at 165 and vitamin D severely low at 12.  Refill vitamin D2 50,000 units twice a day and start rosuvastatin 10 mg daily reevaluate vitamin D and lipid profile in 6 weeks labs given prescriptions called to local pharmacy

## 2024-02-01 ENCOUNTER — OFFICE VISIT (OUTPATIENT)
Dept: PRIMARY CARE | Facility: CLINIC | Age: 69
End: 2024-02-01
Payer: MEDICARE

## 2024-02-01 VITALS
BODY MASS INDEX: 29.09 KG/M2 | HEART RATE: 60 BPM | WEIGHT: 181 LBS | HEIGHT: 66 IN | SYSTOLIC BLOOD PRESSURE: 100 MMHG | DIASTOLIC BLOOD PRESSURE: 60 MMHG

## 2024-02-01 DIAGNOSIS — M85.851 OSTEOPENIA OF BOTH HIPS: ICD-10-CM

## 2024-02-01 DIAGNOSIS — M85.852 OSTEOPENIA OF BOTH HIPS: ICD-10-CM

## 2024-02-01 DIAGNOSIS — F11.20 UNCOMPLICATED OPIOID DEPENDENCE (MULTI): ICD-10-CM

## 2024-02-01 DIAGNOSIS — M19.111 OSTEOARTHRITIS OF SHOULDERS DUE TO ROTATOR CUFF INJURY, BILATERAL: ICD-10-CM

## 2024-02-01 DIAGNOSIS — M19.112 OSTEOARTHRITIS OF SHOULDERS DUE TO ROTATOR CUFF INJURY, BILATERAL: ICD-10-CM

## 2024-02-01 DIAGNOSIS — E66.3 OVERWEIGHT WITH BODY MASS INDEX (BMI) OF 29 TO 29.9 IN ADULT: ICD-10-CM

## 2024-02-01 DIAGNOSIS — S46.001S OSTEOARTHRITIS OF SHOULDERS DUE TO ROTATOR CUFF INJURY, BILATERAL: ICD-10-CM

## 2024-02-01 DIAGNOSIS — E78.5 DYSLIPIDEMIA, GOAL LDL BELOW 100: ICD-10-CM

## 2024-02-01 DIAGNOSIS — E55.9 VITAMIN D DEFICIENCY: ICD-10-CM

## 2024-02-01 DIAGNOSIS — Z00.00 MEDICARE ANNUAL WELLNESS VISIT, SUBSEQUENT: ICD-10-CM

## 2024-02-01 DIAGNOSIS — I10 ESSENTIAL HYPERTENSION: ICD-10-CM

## 2024-02-01 DIAGNOSIS — S46.002S OSTEOARTHRITIS OF SHOULDERS DUE TO ROTATOR CUFF INJURY, BILATERAL: ICD-10-CM

## 2024-02-01 DIAGNOSIS — Z00.00 ROUTINE GENERAL MEDICAL EXAMINATION AT HEALTH CARE FACILITY: Primary | ICD-10-CM

## 2024-02-01 PROBLEM — F33.1 MODERATE EPISODE OF RECURRENT MAJOR DEPRESSIVE DISORDER (MULTI): Status: RESOLVED | Noted: 2023-02-07 | Resolved: 2024-02-01

## 2024-02-01 PROCEDURE — G0446 INTENS BEHAVE THER CARDIO DX: HCPCS | Performed by: INTERNAL MEDICINE

## 2024-02-01 PROCEDURE — 1170F FXNL STATUS ASSESSED: CPT | Performed by: INTERNAL MEDICINE

## 2024-02-01 PROCEDURE — 1160F RVW MEDS BY RX/DR IN RCRD: CPT | Performed by: INTERNAL MEDICINE

## 2024-02-01 PROCEDURE — 1159F MED LIST DOCD IN RCRD: CPT | Performed by: INTERNAL MEDICINE

## 2024-02-01 PROCEDURE — G0444 DEPRESSION SCREEN ANNUAL: HCPCS | Performed by: INTERNAL MEDICINE

## 2024-02-01 PROCEDURE — 99497 ADVNCD CARE PLAN 30 MIN: CPT | Performed by: INTERNAL MEDICINE

## 2024-02-01 PROCEDURE — 3074F SYST BP LT 130 MM HG: CPT | Performed by: INTERNAL MEDICINE

## 2024-02-01 PROCEDURE — 3078F DIAST BP <80 MM HG: CPT | Performed by: INTERNAL MEDICINE

## 2024-02-01 PROCEDURE — 3008F BODY MASS INDEX DOCD: CPT | Performed by: INTERNAL MEDICINE

## 2024-02-01 PROCEDURE — 99397 PER PM REEVAL EST PAT 65+ YR: CPT | Performed by: INTERNAL MEDICINE

## 2024-02-01 PROCEDURE — 1036F TOBACCO NON-USER: CPT | Performed by: INTERNAL MEDICINE

## 2024-02-01 PROCEDURE — 20610 DRAIN/INJ JOINT/BURSA W/O US: CPT | Performed by: INTERNAL MEDICINE

## 2024-02-01 RX ORDER — LIDOCAINE HYDROCHLORIDE 10 MG/ML
0.5 INJECTION INFILTRATION; PERINEURAL
Status: COMPLETED | OUTPATIENT
Start: 2024-02-01 | End: 2024-02-01

## 2024-02-01 RX ORDER — TRIAMCINOLONE ACETONIDE 40 MG/ML
40 INJECTION, SUSPENSION INTRA-ARTICULAR; INTRAMUSCULAR
Status: COMPLETED | OUTPATIENT
Start: 2024-02-01 | End: 2024-02-01

## 2024-02-01 RX ORDER — ROSUVASTATIN CALCIUM 5 MG/1
5 TABLET, COATED ORAL EVERY OTHER DAY
Qty: 45 TABLET | Refills: 3 | Status: SHIPPED | OUTPATIENT
Start: 2024-02-01 | End: 2025-01-31

## 2024-02-01 RX ADMIN — LIDOCAINE HYDROCHLORIDE 0.5 ML: 10 INJECTION INFILTRATION; PERINEURAL at 10:31

## 2024-02-01 RX ADMIN — TRIAMCINOLONE ACETONIDE 40 MG: 40 INJECTION, SUSPENSION INTRA-ARTICULAR; INTRAMUSCULAR at 10:31

## 2024-02-01 ASSESSMENT — ENCOUNTER SYMPTOMS
LOSS OF SENSATION IN FEET: 0
DEPRESSION: 0
OCCASIONAL FEELINGS OF UNSTEADINESS: 0

## 2024-02-01 ASSESSMENT — ANXIETY QUESTIONNAIRES
1. FEELING NERVOUS, ANXIOUS, OR ON EDGE: NOT AT ALL
IF YOU CHECKED OFF ANY PROBLEMS ON THIS QUESTIONNAIRE, HOW DIFFICULT HAVE THESE PROBLEMS MADE IT FOR YOU TO DO YOUR WORK, TAKE CARE OF THINGS AT HOME, OR GET ALONG WITH OTHER PEOPLE: NOT DIFFICULT AT ALL
7. FEELING AFRAID AS IF SOMETHING AWFUL MIGHT HAPPEN: NOT AT ALL
4. TROUBLE RELAXING: NOT AT ALL
GAD7 TOTAL SCORE: 0
5. BEING SO RESTLESS THAT IT IS HARD TO SIT STILL: NOT AT ALL
3. WORRYING TOO MUCH ABOUT DIFFERENT THINGS: NOT AT ALL
6. BECOMING EASILY ANNOYED OR IRRITABLE: NOT AT ALL
2. NOT BEING ABLE TO STOP OR CONTROL WORRYING: NOT AT ALL

## 2024-02-01 ASSESSMENT — ACTIVITIES OF DAILY LIVING (ADL)
DOING_HOUSEWORK: INDEPENDENT
GROCERY_SHOPPING: INDEPENDENT
MANAGING_FINANCES: INDEPENDENT
TAKING_MEDICATION: INDEPENDENT
BATHING: INDEPENDENT
DRESSING: INDEPENDENT

## 2024-02-01 ASSESSMENT — COLUMBIA-SUICIDE SEVERITY RATING SCALE - C-SSRS
1. IN THE PAST MONTH, HAVE YOU WISHED YOU WERE DEAD OR WISHED YOU COULD GO TO SLEEP AND NOT WAKE UP?: NO
2. HAVE YOU ACTUALLY HAD ANY THOUGHTS OF KILLING YOURSELF?: NO
6. HAVE YOU EVER DONE ANYTHING, STARTED TO DO ANYTHING, OR PREPARED TO DO ANYTHING TO END YOUR LIFE?: NO

## 2024-02-01 ASSESSMENT — PATIENT HEALTH QUESTIONNAIRE - PHQ9
SUM OF ALL RESPONSES TO PHQ9 QUESTIONS 1 AND 2: 0
1. LITTLE INTEREST OR PLEASURE IN DOING THINGS: NOT AT ALL
2. FEELING DOWN, DEPRESSED OR HOPELESS: NOT AT ALL

## 2024-02-01 NOTE — PROGRESS NOTES
"Subjective   Reason for Visit: Jenna Ca is an 68 y.o. female here for a Medicare Wellness visit.          Reviewed all medications by prescribing practitioner or clinical pharmacist (such as prescriptions, OTCs, herbal therapies and supplements) and documented in the medical record.    HPI    Patient Care Team:  Miguelito Mota MD PhD as PCP - [employer] PCP     Review of Systems    Objective   Vitals:  /60 (BP Location: Right arm, Patient Position: Sitting)   Pulse 60   Ht 1.664 m (5' 5.5\")   Wt 82.1 kg (181 lb)   BMI 29.66 kg/m²       Physical Exam    Assessment/Plan   Problem List Items Addressed This Visit       Medicare annual wellness visit, subsequent          "

## 2024-02-01 NOTE — PROGRESS NOTES
Depression and anxiety screening completed   PHQ9 score   GAD7 score   I spent 15 minutes obtaining and discussing depression screening using PHQ 2 questions with results documented in chart.  Screening using PHQ-9 and DARY-7 scores were used for follow-up with treatment and referral plan discussed.      I spent 15 minutes face-to-face with this individual discussing the cardiovascular risk and behavioral therapies of nutritional choices exercise and elimination of habits contributing to risk .We agreed on a plan how they may be able to reduce  current cardiovascular risk.  Per patient with calculation greater than 10% aspirin use was discussed and encouraged unless known allergy or increased risk of bleeding contraindicates use patient's 10-year CV risk estimate calculates:I spent greater than 15 minutes discussing advance care planning including the explanation and discussion of advanced directives.  If patient does not have current up-to-date documents examples and information provided on how to create both living will and power of . [facility] toolkit was given to patient and was encouraged to work out completing these documents.Patient ID: Jenna Ca is a 68 y.o. female.    Joint Injection Large/Arthrocentesis: bilateral subacromial bursa on 2/1/2024 10:31 AM  Details: superolateral approach  Medications (Right): 40 mg triamcinolone acetonide 40 mg/mL; 0.5 mL lidocaine 10 mg/mL (1 %)  Medications (Left): 40 mg triamcinolone acetonide 40 mg/mL; 0.5 mL lidocaine 10 mg/mL (1 %)  Procedure, treatment alternatives, risks and benefits explained, specific risks discussed. Immediately prior to procedure a time out was called to verify the correct patient, procedure, equipment, support staff and site/side marked as required. Patient was prepped and draped in the usual sterile fashion.       Subjective   Reason for Visit: Jenna aC is an 68 y.o. female here for a Medicare Wellness visit.     Past Medical,  "Surgical, and Family History reviewed and updated in chart.    Reviewed all medications by prescribing practitioner or clinical pharmacist (such as prescriptions, OTCs, herbal therapies and supplements) and documented in the medical record.    HPI    Patient Care Team:  Miguelito Mtoa MD PhD as PCP - [employer] PCP     Review of Systems   All other systems reviewed and are negative.      Objective   Vitals:  /60 (BP Location: Right arm, Patient Position: Sitting)   Pulse 60   Ht 1.664 m (5' 5.5\")   Wt 82.1 kg (181 lb)   BMI 29.66 kg/m²       Physical Exam  Vitals and nursing note reviewed.   Constitutional:       General: She is not in acute distress.     Appearance: Normal appearance. She is well-developed. She is not toxic-appearing.   HENT:      Head: Normocephalic and atraumatic.      Right Ear: Tympanic membrane and external ear normal.      Left Ear: Tympanic membrane and external ear normal.      Nose: Nose normal.      Mouth/Throat:      Mouth: Mucous membranes are moist.      Pharynx: Oropharynx is clear. No oropharyngeal exudate or posterior oropharyngeal erythema.      Tonsils: No tonsillar exudate. 2+ on the right. 2+ on the left.   Eyes:      Extraocular Movements: Extraocular movements intact.      Conjunctiva/sclera: Conjunctivae normal.   Cardiovascular:      Rate and Rhythm: Normal rate and regular rhythm.      Pulses: Normal pulses.      Heart sounds: Normal heart sounds. No murmur heard.  Pulmonary:      Effort: Pulmonary effort is normal.      Breath sounds: Normal breath sounds.   Abdominal:      General: Abdomen is flat. Bowel sounds are normal.      Palpations: Abdomen is soft.   Musculoskeletal:      Cervical back: Neck supple.   Lymphadenopathy:      Cervical: No cervical adenopathy.   Skin:     General: Skin is warm and dry.      Findings: No rash.   Neurological:      Mental Status: She is alert. Mental status is at baseline.   Psychiatric:         Mood and Affect: " Mood normal.         Behavior: Behavior normal.         Thought Content: Thought content normal.         Judgment: Judgment normal.         Assessment/Plan   Problem List Items Addressed This Visit       Essential hypertension    Current Assessment & Plan     Blood pressure stable at this time continue lisinopril         Dyslipidemia, goal LDL below 100    Current Assessment & Plan     Patient reporting intolerance to rosuvastatin 10 mg will reduce dose to 5 mg every other day and reevaluate with next visit         Opioid dependence (CMS/[facility])    Current Assessment & Plan     Stable on Suboxone treatment managed by addiction specialist compliant with regular care doing well         Osteoarthritis of shoulders due to rotator cuff injury, bilateral    Current Assessment & Plan     Bilateral corticosteroid injections given no complications patient tolerated well         Relevant Medications    lidocaine (Xylocaine) 10 mg/mL (1 %) injection 0.5 mL (Completed)    lidocaine (Xylocaine) 10 mg/mL (1 %) injection 0.5 mL (Completed)    triamcinolone acetonide (Kenalog-40) injection 40 mg (Completed)    triamcinolone acetonide (Kenalog-40) injection 40 mg (Completed)    Other Relevant Orders    Joint Injection Large/Arthrocentesis: bilateral subacromial bursa (Completed)    Overweight with body mass index (BMI) of 29 to 29.9 in adult    Current Assessment & Plan     Improvement in BMI at this time continue diet lifestyle changes         Medicare annual wellness visit, subsequent    Current Assessment & Plan     Update bone density with history of osteopenia mammogram order given advanced medical directive discussed and given paperwork on completing to discuss with her  education given today         Relevant Medications    rosuvastatin (Crestor) 5 mg tablet    Other Relevant Orders    XR DEXA bone density    Vitamin D 25-Hydroxy,Total (for eval of Vitamin D levels)    Comprehensive metabolic panel    Lipid Panel    Follow  Up In [facility] - PCP - Established     Other Visit Diagnoses       Routine general medical examination at health care facility    -  Primary    Osteopenia of both hips        Relevant Orders    XR DEXA bone density    Vitamin D deficiency        Relevant Orders    Vitamin D 25-Hydroxy,Total (for eval of Vitamin D levels)

## 2024-02-01 NOTE — ASSESSMENT & PLAN NOTE
Update bone density with history of osteopenia mammogram order given advanced medical directive discussed and given paperwork on completing to discuss with her  education given today

## 2024-02-01 NOTE — ASSESSMENT & PLAN NOTE
Patient reporting intolerance to rosuvastatin 10 mg will reduce dose to 5 mg every other day and reevaluate with next visit

## 2024-02-01 NOTE — ASSESSMENT & PLAN NOTE
Stable on Suboxone treatment managed by addiction specialist compliant with regular care doing well

## 2024-02-16 ENCOUNTER — OFFICE VISIT (OUTPATIENT)
Dept: PRIMARY CARE | Facility: CLINIC | Age: 69
End: 2024-02-16
Payer: MEDICARE

## 2024-02-16 DIAGNOSIS — F11.20 UNCOMPLICATED OPIOID DEPENDENCE (MULTI): Primary | ICD-10-CM

## 2024-02-16 DIAGNOSIS — D22.30 NEVUS OF FACE: ICD-10-CM

## 2024-02-16 PROCEDURE — 1160F RVW MEDS BY RX/DR IN RCRD: CPT | Performed by: FAMILY MEDICINE

## 2024-02-16 PROCEDURE — 1159F MED LIST DOCD IN RCRD: CPT | Performed by: FAMILY MEDICINE

## 2024-02-16 PROCEDURE — 11104 PUNCH BX SKIN SINGLE LESION: CPT | Performed by: FAMILY MEDICINE

## 2024-02-16 PROCEDURE — 88305 TISSUE EXAM BY PATHOLOGIST: CPT | Performed by: DERMATOLOGY

## 2024-02-16 PROCEDURE — 3008F BODY MASS INDEX DOCD: CPT | Performed by: FAMILY MEDICINE

## 2024-02-16 PROCEDURE — 88305 TISSUE EXAM BY PATHOLOGIST: CPT

## 2024-02-16 PROCEDURE — 1036F TOBACCO NON-USER: CPT | Performed by: FAMILY MEDICINE

## 2024-02-16 PROCEDURE — 99214 OFFICE O/P EST MOD 30 MIN: CPT | Performed by: FAMILY MEDICINE

## 2024-02-16 RX ORDER — BUPRENORPHINE AND NALOXONE 8; 2 MG/1; MG/1
1 FILM, SOLUBLE BUCCAL; SUBLINGUAL DAILY
Qty: 28 FILM | Refills: 0 | Status: SHIPPED | OUTPATIENT
Start: 2024-02-16 | End: 2024-03-15 | Stop reason: SDUPTHER

## 2024-02-16 NOTE — PROGRESS NOTES
Chief complaint: Opioid dependence treatment    HPI: Cravings for opioids and withdrawals symptoms were controlled with current dose of buprenorphine tx. Pt denies abusing any opioids. Patient has been attending meeting/counseling as recommended. Pt felt normal and functional with buprenorphine tx. Pt was able to maintain good relationship with other people.  Pt had good family  support. Pt denies drinking eoth. No headache, constipation, imbalance, or confusion. Good appetite. Pt would have cravings and withdrawals and may  abuse opioids again without buprenorphine treatment. pt denies having depressed mood. pt has had a mole at left face  for many years.   pt was concerned and requested evaluation.    PE:  No acute distress, well groomed, eyes: normal pupil size, no sclera icterus, no cervical or axillary lymphadenopathy,  Lungs: CTA bilaterally, heart: RRR, abdomen: soft, no tenderness, BS+, no imbalance. there was a 0.4x0.4 cm brown mole at left face  with irregular border, no tenderness or bleeding, Normal mood and affect. No HI/SI    A/P:   Opioid dependence, pt feels functional at daily activities while on buprenorphine treatment. Pt has been attending counseling/NA/AA meeting for opioid dependence. Pt denies any opioid abuse. Cravings and withdrawals were controlled. I reviewed pt's OARRS report today. The OARRS report showed pt filled medications as prescribed.   Inform pt to avoid  alcohol or benzodiazepines while on buprenorphine tx. I have considered the risks of abuse, dependence, addiction and diversion. I believe that it is clinically appropriate for the pt to continue current buprenorphine treatment. Continue counseling for opioid dependence. Recommend pt to keep  buprenorphine in a secure place and out of reach of children. Inform pt that lending buprenorphine is illegal. Recommend to keep narcan available in case of opioid OD. Pt should not drive or operate machinery if incoordination or confusion  occurs. f/u as scheduled   atypical mole, Recommend biopsy for evaluation. Risks of biopsy such as nonhealing wound, infection, scar, numbness etc. were discussed with patient, who understood well. Pt agreed to the procedure. Under sterile condition with betadine and 1 ml 1% lidocaine anesthesia, the mole was biopsied and sent to pathology. The wound was covered with sterile gauze. Advise pt to keep area clean and dry. We will notify the pt of the pathology result of the skin biopsy.

## 2024-02-21 LAB
LABORATORY COMMENT REPORT: NORMAL
PATH REPORT.FINAL DX SPEC: NORMAL
PATH REPORT.GROSS SPEC: NORMAL
PATH REPORT.MICROSCOPIC SPEC OTHER STN: NORMAL
PATH REPORT.RELEVANT HX SPEC: NORMAL
PATH REPORT.TOTAL CANCER: NORMAL

## 2024-02-23 ENCOUNTER — OFFICE VISIT (OUTPATIENT)
Dept: PRIMARY CARE | Facility: CLINIC | Age: 69
End: 2024-02-23
Payer: MEDICARE

## 2024-02-23 DIAGNOSIS — C44.310 BASAL CELL CARCINOMA (BCC) OF FACE: Primary | ICD-10-CM

## 2024-02-23 PROCEDURE — 1160F RVW MEDS BY RX/DR IN RCRD: CPT | Performed by: FAMILY MEDICINE

## 2024-02-23 PROCEDURE — 99213 OFFICE O/P EST LOW 20 MIN: CPT | Performed by: FAMILY MEDICINE

## 2024-02-23 PROCEDURE — 1159F MED LIST DOCD IN RCRD: CPT | Performed by: FAMILY MEDICINE

## 2024-02-23 PROCEDURE — 3008F BODY MASS INDEX DOCD: CPT | Performed by: FAMILY MEDICINE

## 2024-02-23 PROCEDURE — 1036F TOBACCO NON-USER: CPT | Performed by: FAMILY MEDICINE

## 2024-02-23 NOTE — PROGRESS NOTES
Subjective   Patient ID: Jenna Ca is a 68 y.o. female who presents for lab fu    HPI   Left face pathology was bcc. No cough. Wt loss or neck lymphadenopathy  Review of Systems    Objective   There were no vitals taken for this visit.    Physical Exam  No acute distress. No cervical or axillary lymph node tenderness or enlargement.  Lungs: CTA b/l, Heart: RRR. patient walks with a good balance`    Assessment/Plan     Left face bcc. Recommend excision. Pt prefers to see her derm fro tx

## 2024-02-26 ENCOUNTER — TELEPHONE (OUTPATIENT)
Dept: PRIMARY CARE | Facility: CLINIC | Age: 69
End: 2024-02-26
Payer: MEDICARE

## 2024-03-04 ENCOUNTER — LAB (OUTPATIENT)
Dept: LAB | Facility: LAB | Age: 69
End: 2024-03-04
Payer: MEDICARE

## 2024-03-04 DIAGNOSIS — F11.20 UNCOMPLICATED OPIOID DEPENDENCE (MULTI): ICD-10-CM

## 2024-03-04 LAB
AMPHETAMINES UR QL SCN: NORMAL
BARBITURATES UR QL SCN: NORMAL
BENZODIAZ UR QL SCN: NORMAL
BZE UR QL SCN: NORMAL
CANNABINOIDS UR QL SCN: NORMAL
FENTANYL+NORFENTANYL UR QL SCN: NORMAL
OPIATES UR QL SCN: NORMAL
OXYCODONE+OXYMORPHONE UR QL SCN: NORMAL
PCP UR QL SCN: NORMAL

## 2024-03-04 PROCEDURE — 80307 DRUG TEST PRSMV CHEM ANLYZR: CPT

## 2024-03-06 ENCOUNTER — APPOINTMENT (OUTPATIENT)
Dept: RADIOLOGY | Facility: CLINIC | Age: 69
End: 2024-03-06
Payer: MEDICARE

## 2024-03-13 LAB
BUPRENORPHINE UR-MCNC: 4 NG/ML
BUPRENORPHINE UR-MCNC: 760 NG/ML
NALOXONE UR CFM-MCNC: <100 NG/ML
NORBUPRENORPHINE UR CFM-MCNC: 902 NG/ML
NORBUPRENORPHINE UR-MCNC: 435 NG/ML

## 2024-03-15 ENCOUNTER — OFFICE VISIT (OUTPATIENT)
Dept: PRIMARY CARE | Facility: CLINIC | Age: 69
End: 2024-03-15
Payer: MEDICARE

## 2024-03-15 VITALS — SYSTOLIC BLOOD PRESSURE: 125 MMHG | DIASTOLIC BLOOD PRESSURE: 76 MMHG

## 2024-03-15 DIAGNOSIS — F11.20 UNCOMPLICATED OPIOID DEPENDENCE (MULTI): ICD-10-CM

## 2024-03-15 PROCEDURE — 3074F SYST BP LT 130 MM HG: CPT | Performed by: FAMILY MEDICINE

## 2024-03-15 PROCEDURE — 3078F DIAST BP <80 MM HG: CPT | Performed by: FAMILY MEDICINE

## 2024-03-15 PROCEDURE — 1036F TOBACCO NON-USER: CPT | Performed by: FAMILY MEDICINE

## 2024-03-15 PROCEDURE — 99214 OFFICE O/P EST MOD 30 MIN: CPT | Performed by: FAMILY MEDICINE

## 2024-03-15 PROCEDURE — 1159F MED LIST DOCD IN RCRD: CPT | Performed by: FAMILY MEDICINE

## 2024-03-15 PROCEDURE — 80305 DRUG TEST PRSMV DIR OPT OBS: CPT | Performed by: FAMILY MEDICINE

## 2024-03-15 PROCEDURE — 3008F BODY MASS INDEX DOCD: CPT | Performed by: FAMILY MEDICINE

## 2024-03-15 PROCEDURE — 1160F RVW MEDS BY RX/DR IN RCRD: CPT | Performed by: FAMILY MEDICINE

## 2024-03-15 RX ORDER — BUPRENORPHINE AND NALOXONE 8; 2 MG/1; MG/1
1 FILM, SOLUBLE BUCCAL; SUBLINGUAL DAILY
Qty: 28 FILM | Refills: 0 | Status: SHIPPED | OUTPATIENT
Start: 2024-03-15 | End: 2024-04-12 | Stop reason: SDUPTHER

## 2024-03-15 NOTE — PROGRESS NOTES
Chief complaint: Opioid dependence treatment    HPI: Pt stated that current dose of buprenorphine controlled cravings for opioids and withdrawals symptoms well. Pt feels normal and performs daily activities well.  Pt denies abusing any opioids. pt has social support and has been having counseling. pt denies drinking alcohol. No SE from buprenorphine  such as HA, constipation, imbalance or confusion. Pt sleeps well most night. Pt would have cravings and withdrawals without buprenorphine treatment. Pt denies depressed mood. No HI/SI.   PE: A&Ox3, No acute distress, well groomed, eyes: no sclera icterus, Lungs: CTA bilaterally, heart: RRR, abdomen: soft, no tenderness, BS+, normal station and gait, normal mood and affect. No HI/SI   A/P:  Opioid dependence, Pt denies cravings and withdrawals well. Pt denies any opioid abuse.   I personally reviewed pt's urine drug screen done in office today.  The urine drug screen was negative for opiate. I reviewed pt's updated OARRS report today.  The OARRS report showed that pt filled buprenorphine consistently as prescribed. Caution patient that alcohol  should be avoided while on buprenorphine. Pt understood that buprenorphine is addictive. Lending meds is illegal. Recommend pt to lock  buprenorphine in a safe place and  out of reach of children. Recommend to keep naloxone available in case of opioid OD. Cont counseling for opioid dependence. Pt should not drive or operate machinery if incoordination or confusion occurs. I have considered the risks of abuse, dependence, addiction and diversion. I believe that it is clinically appropriate for the pt to continue current buprenorphine treatment. Will taper the dose as tolerated.  RTC as scheduled

## 2024-03-16 DIAGNOSIS — F33.1 MAJOR DEPRESSIVE DISORDER, RECURRENT, MODERATE (MULTI): ICD-10-CM

## 2024-03-18 RX ORDER — PAROXETINE HYDROCHLORIDE 40 MG/1
TABLET, FILM COATED ORAL
Qty: 90 TABLET | Refills: 3 | Status: SHIPPED | OUTPATIENT
Start: 2024-03-18

## 2024-03-22 ENCOUNTER — TELEPHONE (OUTPATIENT)
Dept: PHARMACY | Facility: HOSPITAL | Age: 69
End: 2024-03-22
Payer: MEDICARE

## 2024-03-22 NOTE — TELEPHONE ENCOUNTER
Population Health: Outreach by Ambulatory Pharmacy Team    Payor: Brittany CHATMAN  Reason: Adherence  Medication: Lisinopril 20mg  Outcome: Patient Reached: Claims Adherence, recently filled Lisinopril - no concerns    Ava Razo, CullenD

## 2024-04-08 ENCOUNTER — APPOINTMENT (OUTPATIENT)
Dept: RADIOLOGY | Facility: CLINIC | Age: 69
End: 2024-04-08
Payer: MEDICARE

## 2024-04-12 ENCOUNTER — OFFICE VISIT (OUTPATIENT)
Dept: PRIMARY CARE | Facility: CLINIC | Age: 69
End: 2024-04-12
Payer: MEDICARE

## 2024-04-12 VITALS — SYSTOLIC BLOOD PRESSURE: 125 MMHG | DIASTOLIC BLOOD PRESSURE: 75 MMHG

## 2024-04-12 DIAGNOSIS — F11.20 UNCOMPLICATED OPIOID DEPENDENCE (MULTI): ICD-10-CM

## 2024-04-12 PROCEDURE — 1159F MED LIST DOCD IN RCRD: CPT | Performed by: FAMILY MEDICINE

## 2024-04-12 PROCEDURE — 1160F RVW MEDS BY RX/DR IN RCRD: CPT | Performed by: FAMILY MEDICINE

## 2024-04-12 PROCEDURE — 3078F DIAST BP <80 MM HG: CPT | Performed by: FAMILY MEDICINE

## 2024-04-12 PROCEDURE — 99213 OFFICE O/P EST LOW 20 MIN: CPT | Performed by: FAMILY MEDICINE

## 2024-04-12 PROCEDURE — 3074F SYST BP LT 130 MM HG: CPT | Performed by: FAMILY MEDICINE

## 2024-04-12 PROCEDURE — 3008F BODY MASS INDEX DOCD: CPT | Performed by: FAMILY MEDICINE

## 2024-04-12 RX ORDER — BUPRENORPHINE AND NALOXONE 8; 2 MG/1; MG/1
1 FILM, SOLUBLE BUCCAL; SUBLINGUAL DAILY
Qty: 28 FILM | Refills: 0 | Status: SHIPPED | OUTPATIENT
Start: 2024-04-12 | End: 2024-05-10 | Stop reason: SDUPTHER

## 2024-04-12 RX ORDER — NALOXONE HYDROCHLORIDE 4 MG/.1ML
SPRAY NASAL
Qty: 2 EACH | Refills: 1 | Status: SHIPPED | OUTPATIENT
Start: 2024-04-12

## 2024-04-12 NOTE — PROGRESS NOTES
Chief complaint: Opioid dependence treatment    pt denies cravings for opioids and withdrawals symptoms with buprenorphine tx. pt felt functional at daily activities. No headache, confusion, drowsiness, imbalance, insomnia or constipation. Pt denies abusing any opioids.  Pt denies drinking eoth.  pt has been attending counseling and/or NA/AA meeting. Pt has good family support. Pt would have cravings and withdrawals without buprenorphine treatment. pt denies depressed mood or HI/SI.   PE: No acute distress, well groomed, eyes: normal pupil size, no sclera icterus, no nasal discharge, no exertional respiration, Lungs: CTA bilaterally, heart: RRR, abdomen: soft, no tenderness, BS+, good balance, good memory and judgment. No depressed mood   A/P:  Opioid dependence, Pt denies any opioid abuse. Cravings and withdrawals were controlled.   I reviewed pt's OARRS report today. The OARRS report showed pt filled meds as prescribed.  No physician shopping.   Inform pt that counseling and/or NA/AA meeting are mandatory for the buprenorphine treatment program.  Inform pt that buprenorphine is addictive and that buprenorphine diversion is against the law. Pt has narcan readily available in case of opioid OD. Pt agreed to ask for a new prescription for narcan upon expiration or use of the old narcan kit. Continue counseling and  buprenorphine tx for opioid dependence. Recommend  to lock  buprenorphine in a secure  location. I have considered the risks of abuse, dependence, addiction and diversion. I believe that it is clinically appropriate for the pt to continue current buprenorphine treatment. f/u as scheduled

## 2024-04-16 ENCOUNTER — HOSPITAL ENCOUNTER (OUTPATIENT)
Dept: RADIOLOGY | Facility: CLINIC | Age: 69
Discharge: HOME | End: 2024-04-16
Payer: MEDICARE

## 2024-04-16 VITALS — BODY MASS INDEX: 26.99 KG/M2 | WEIGHT: 162 LBS | HEIGHT: 65 IN

## 2024-04-16 DIAGNOSIS — M85.852 OSTEOPENIA OF BOTH HIPS: ICD-10-CM

## 2024-04-16 DIAGNOSIS — M85.851 OSTEOPENIA OF BOTH HIPS: ICD-10-CM

## 2024-04-16 DIAGNOSIS — Z00.00 MEDICARE ANNUAL WELLNESS VISIT, SUBSEQUENT: ICD-10-CM

## 2024-04-16 DIAGNOSIS — Z12.31 ENCOUNTER FOR SCREENING MAMMOGRAM FOR MALIGNANT NEOPLASM OF BREAST: ICD-10-CM

## 2024-04-16 PROCEDURE — 77080 DXA BONE DENSITY AXIAL: CPT | Performed by: RADIOLOGY

## 2024-04-16 PROCEDURE — 77063 BREAST TOMOSYNTHESIS BI: CPT | Performed by: RADIOLOGY

## 2024-04-16 PROCEDURE — 77080 DXA BONE DENSITY AXIAL: CPT

## 2024-04-16 PROCEDURE — 77067 SCR MAMMO BI INCL CAD: CPT | Performed by: RADIOLOGY

## 2024-04-16 PROCEDURE — 77067 SCR MAMMO BI INCL CAD: CPT

## 2024-05-01 ENCOUNTER — APPOINTMENT (OUTPATIENT)
Dept: PRIMARY CARE | Facility: CLINIC | Age: 69
End: 2024-05-01
Payer: MEDICARE

## 2024-05-06 ENCOUNTER — APPOINTMENT (OUTPATIENT)
Dept: PRIMARY CARE | Facility: CLINIC | Age: 69
End: 2024-05-06
Payer: MEDICARE

## 2024-05-10 ENCOUNTER — OFFICE VISIT (OUTPATIENT)
Dept: PRIMARY CARE | Facility: CLINIC | Age: 69
End: 2024-05-10
Payer: MEDICARE

## 2024-05-10 VITALS — SYSTOLIC BLOOD PRESSURE: 127 MMHG | DIASTOLIC BLOOD PRESSURE: 67 MMHG

## 2024-05-10 DIAGNOSIS — F11.20 UNCOMPLICATED OPIOID DEPENDENCE (MULTI): ICD-10-CM

## 2024-05-10 PROCEDURE — 1124F ACP DISCUSS-NO DSCNMKR DOCD: CPT | Performed by: FAMILY MEDICINE

## 2024-05-10 PROCEDURE — 1159F MED LIST DOCD IN RCRD: CPT | Performed by: FAMILY MEDICINE

## 2024-05-10 PROCEDURE — 1036F TOBACCO NON-USER: CPT | Performed by: FAMILY MEDICINE

## 2024-05-10 PROCEDURE — 1160F RVW MEDS BY RX/DR IN RCRD: CPT | Performed by: FAMILY MEDICINE

## 2024-05-10 PROCEDURE — 3074F SYST BP LT 130 MM HG: CPT | Performed by: FAMILY MEDICINE

## 2024-05-10 PROCEDURE — 3008F BODY MASS INDEX DOCD: CPT | Performed by: FAMILY MEDICINE

## 2024-05-10 PROCEDURE — 80305 DRUG TEST PRSMV DIR OPT OBS: CPT | Performed by: FAMILY MEDICINE

## 2024-05-10 PROCEDURE — 3078F DIAST BP <80 MM HG: CPT | Performed by: FAMILY MEDICINE

## 2024-05-10 PROCEDURE — 99214 OFFICE O/P EST MOD 30 MIN: CPT | Performed by: FAMILY MEDICINE

## 2024-05-10 RX ORDER — BUPRENORPHINE AND NALOXONE 8; 2 MG/1; MG/1
1 FILM, SOLUBLE BUCCAL; SUBLINGUAL DAILY
Qty: 28 FILM | Refills: 0 | Status: SHIPPED | OUTPATIENT
Start: 2024-05-10 | End: 2024-06-07 | Stop reason: SDUPTHER

## 2024-05-10 NOTE — PROGRESS NOTES
Chief complaint: Opioid dependence treatment  Pt stated that cravings for opioids and withdrawals symptoms were controlled with buprenorphine. Pt has been taking buprenorphine as instructed. Pt denies abusing any opioids.  Pt sleeps well most nights. pt has been attending counseling and/or NA/AA meeting. Pt denies drinking eoth. Pt denies having HA, constipation, imbalance or confusion. Pt denies mental cloudiness, drowsiness.  Pt felt functional while on current buprenorphine treatment. Without buprenorphine treatment. Pt would have cravings and withdrawals. Pt denies depressed mood or HI/SI.   No acute distress, well groomed, eyes: no sclera icterus, normal pupil size, neck is supple, Lungs: CTA bilaterally, heart: RRR, abdomen: soft, no tenderness, normal BS, good balance, good judgment and insight. Good mood and normal affect. No HI/SI   Opioid dependence, Pt denies any opioid abuse. Cravings and withdrawals were controlled.   I personally reviewed pt's urine drug screen test performed in office today.  The urine drug screen was negative for opiate. I reviewed pt's updated OARRS report today.  The OARRS report showed that pt filled buprenorphine as prescribed. No sign of physician shopping. The patient was informed that Eoth  should be avoided while on buprenorphine. pt understood that diversion of buprenorphine is illegal. Recommend to taper down the dose of buprenorphine when pt feels comfortable. Recommend pt to keep buprenorphine in a secure place and out of reach of children. Recommend to keep naloxone kits available in case of opioid OD. Continue counseling for opioid dependence. Pt should not drive or operate machinery if incoordination or confusion occurs. I have considered the risks of abuse, dependence, addiction and diversion. I believe that it is clinically appropriate for the pt to continue buprenorphine treatment.  pt knows how to use narcan in case of overdose emergency. Caution pt that  buprenorphine can potentially damage teeth. Recommend to see a dentist regularly. f/u as scheduled

## 2024-06-07 ENCOUNTER — OFFICE VISIT (OUTPATIENT)
Dept: PRIMARY CARE | Facility: CLINIC | Age: 69
End: 2024-06-07
Payer: MEDICARE

## 2024-06-07 VITALS — DIASTOLIC BLOOD PRESSURE: 68 MMHG | SYSTOLIC BLOOD PRESSURE: 127 MMHG

## 2024-06-07 DIAGNOSIS — F11.20 UNCOMPLICATED OPIOID DEPENDENCE (MULTI): ICD-10-CM

## 2024-06-07 PROCEDURE — 3008F BODY MASS INDEX DOCD: CPT | Performed by: FAMILY MEDICINE

## 2024-06-07 PROCEDURE — 3078F DIAST BP <80 MM HG: CPT | Performed by: FAMILY MEDICINE

## 2024-06-07 PROCEDURE — 3074F SYST BP LT 130 MM HG: CPT | Performed by: FAMILY MEDICINE

## 2024-06-07 PROCEDURE — 80305 DRUG TEST PRSMV DIR OPT OBS: CPT | Performed by: FAMILY MEDICINE

## 2024-06-07 PROCEDURE — 99214 OFFICE O/P EST MOD 30 MIN: CPT | Performed by: FAMILY MEDICINE

## 2024-06-07 RX ORDER — BUPRENORPHINE AND NALOXONE 8; 2 MG/1; MG/1
1 FILM, SOLUBLE BUCCAL; SUBLINGUAL DAILY
Qty: 28 FILM | Refills: 0 | Status: SHIPPED | OUTPATIENT
Start: 2024-06-07

## 2024-06-07 NOTE — PROGRESS NOTES
Chief complaint: Opioid dependence treatment  Pt performs daily activities well with current buprenorphine treatment.  Cravings for opioids and withdrawals symptoms were controlled.  Pt denies abusing any opioids.Pt denies drinking eoth. Pt has been attending meeting or counseling for opioid dependence. Pt has been taking buprenorphine as dir. Pt denies having headache, constipation, mental cloudiness, fatigue, insomnia, drowsiness. Pt would have cravings and withdrawals without buprenorphine treatment. Pt has had good mood. No HI/SI.   PE: No acute distress, well groomed, eyes: normal pupil size, no sclera icterus, normal respiration effort. Lungs: CTA bilaterally, heart: RRR, abdomen: soft, no tenderness, normal BS, Good mood and normal affect. No HI/SI  A/P: Opioid dependence, Pt denies any opioid abuse. Cravings for opioids and withdrawals were controlled.    I personally reviewed pt's urine drug screen test performed in office today.  The urine drug screen was negative for opiate. I reviewed pt's updated OARRS report.  The OARRS report showed that pt filled buprenorphine as prescribed. No opioid shopping.   Pt understood to avoid use  Eoth while on buprenorphine due to potential interactions causing death. Inform pt that buprenorphine is addictive. Inform pt that selling or giving away buprenorphine is against the law. Recommend pt to keep buprenorphine out of reach of children. Recommend to keep naloxone kits available in case of opioid OD. Recommend  counseling for opioid dependence. Pt should not drive or operate machinery if incoordination or confusion occurs. I have considered the risks of abuse, dependence, addiction and diversion. I believe that it is clinically appropriate for the pt to continue buprenorphine treatment. Caution patient that transmucosal buprenorphine can increase the risk of tooth decay, cavities, oral infections, and loss of teeth. Recommend the pt to see a dentist for dental exam  regularly. Recommend pt to gently rinse teeth and gums with water after the medicine has completely dissolved. Informed pt the alternative medical treatments for opioid dependence such as Probuphine implant, SUBLOCADE injection, vivitrol shot, in-patient rehabilitation are available. f/u as scheduled.

## 2024-06-26 ENCOUNTER — APPOINTMENT (OUTPATIENT)
Dept: PRIMARY CARE | Facility: CLINIC | Age: 69
End: 2024-06-26
Payer: MEDICARE

## 2024-07-05 ENCOUNTER — APPOINTMENT (OUTPATIENT)
Dept: PRIMARY CARE | Facility: CLINIC | Age: 69
End: 2024-07-05
Payer: MEDICARE

## 2024-07-05 VITALS — DIASTOLIC BLOOD PRESSURE: 65 MMHG | SYSTOLIC BLOOD PRESSURE: 122 MMHG

## 2024-07-05 DIAGNOSIS — F11.20 UNCOMPLICATED OPIOID DEPENDENCE (MULTI): ICD-10-CM

## 2024-07-05 PROCEDURE — 80305 DRUG TEST PRSMV DIR OPT OBS: CPT | Performed by: FAMILY MEDICINE

## 2024-07-05 PROCEDURE — 3008F BODY MASS INDEX DOCD: CPT | Performed by: FAMILY MEDICINE

## 2024-07-05 PROCEDURE — 3074F SYST BP LT 130 MM HG: CPT | Performed by: FAMILY MEDICINE

## 2024-07-05 PROCEDURE — 99214 OFFICE O/P EST MOD 30 MIN: CPT | Performed by: FAMILY MEDICINE

## 2024-07-05 PROCEDURE — 3078F DIAST BP <80 MM HG: CPT | Performed by: FAMILY MEDICINE

## 2024-07-05 RX ORDER — BUPRENORPHINE AND NALOXONE 8; 2 MG/1; MG/1
1 FILM, SOLUBLE BUCCAL; SUBLINGUAL DAILY
Qty: 28 FILM | Refills: 0 | Status: SHIPPED | OUTPATIENT
Start: 2024-07-05

## 2024-07-05 NOTE — PROGRESS NOTES
Chief complaint: Opioid dependence treatment  Pt has been compliant with taking buprenorphine as dir. pt denies drinking eoth. Pt was functional at daily activities. Pt had good relationship with people.  Pt has been attending AA/NA as instructed. Pt stated that buprenorphine treatment controlled cravings for opioids and withdrawals symptoms well. pt denies abusing any opioids. Pt denies depressed mood. Pt denies HI/SI. Pt had regular BM. No headache or insomnia. No drowsiness, confusion or imbalance.   No distress, well groomed, eyes: No sclera icterus. Normal pupil size, No nasal discharge, no exertional respiration. Lungs: CTA bilaterally, heart: RRR, abdomen: soft, no tenderness, BS+, normal gait and station, no depressed mood or flat affect. No HI/SI   Opioid dependence. Pt denies any opioid abuse while on buprenorphine treatment.   I personally reviewed pt's UDS performed in office today.  UDS was negative for opiate. I reviewed pt's updated OARRS report today.  The OARRS report showed that pt filled buprenorphine as prescribed. No physician shopping.  I have considered the risks of abuse, dependence, addiction and diversion. I believe that it is clinically appropriate for the pt to continue buprenorphine treatment. Inform pt to avoid alcohol while on buprenorphine. Informed pt that buprenorphine is addictive. Caution pt that lending buprenorphine is against the law. Recommend pt to keep Buprenorphine out of reach of children. Recommend to keep naloxone available in case of opioid OD. Caution pt that buprenorphine can potentially damage the teeth. Recommend regular dental check up with the dentist. Continue current dose of buprenorphine. RTC as scheduled. Continue counseling. Pt should not drive or operate machinery if incoordination or confusion occurs.

## 2024-07-09 DIAGNOSIS — I10 ESSENTIAL (PRIMARY) HYPERTENSION: ICD-10-CM

## 2024-07-09 RX ORDER — LISINOPRIL 20 MG/1
TABLET ORAL
Qty: 90 TABLET | Refills: 3 | Status: SHIPPED | OUTPATIENT
Start: 2024-07-09

## 2024-07-11 ENCOUNTER — APPOINTMENT (OUTPATIENT)
Dept: PRIMARY CARE | Facility: CLINIC | Age: 69
End: 2024-07-11
Payer: MEDICARE

## 2024-07-31 DIAGNOSIS — J30.9 ALLERGIC RHINITIS, UNSPECIFIED: ICD-10-CM

## 2024-07-31 RX ORDER — MONTELUKAST SODIUM 10 MG/1
TABLET ORAL
Qty: 90 TABLET | Refills: 3 | Status: SHIPPED | OUTPATIENT
Start: 2024-07-31

## 2024-08-02 ENCOUNTER — APPOINTMENT (OUTPATIENT)
Dept: PRIMARY CARE | Facility: CLINIC | Age: 69
End: 2024-08-02
Payer: MEDICARE

## 2024-08-02 VITALS — SYSTOLIC BLOOD PRESSURE: 125 MMHG | DIASTOLIC BLOOD PRESSURE: 76 MMHG

## 2024-08-02 DIAGNOSIS — F11.20 UNCOMPLICATED OPIOID DEPENDENCE (MULTI): ICD-10-CM

## 2024-08-02 PROCEDURE — 80305 DRUG TEST PRSMV DIR OPT OBS: CPT | Performed by: FAMILY MEDICINE

## 2024-08-02 PROCEDURE — 3074F SYST BP LT 130 MM HG: CPT | Performed by: FAMILY MEDICINE

## 2024-08-02 PROCEDURE — 99214 OFFICE O/P EST MOD 30 MIN: CPT | Performed by: FAMILY MEDICINE

## 2024-08-02 PROCEDURE — 3078F DIAST BP <80 MM HG: CPT | Performed by: FAMILY MEDICINE

## 2024-08-02 RX ORDER — BUPRENORPHINE AND NALOXONE 8; 2 MG/1; MG/1
1 FILM, SOLUBLE BUCCAL; SUBLINGUAL DAILY
Qty: 28 FILM | Refills: 0 | Status: SHIPPED | OUTPATIENT
Start: 2024-08-02

## 2024-08-02 NOTE — PROGRESS NOTES
Chief complaint: Opioid dependence treatment  HPI: Pt denies cravings for opioids and withdrawals symptoms. Pt denies abusing opioid.  Pt has been taking buprenorphine as prescribed.  pt has been attending  counseling or NA/AA meeting as recommended. pt has had good support.  Pt felt functional at daily activities.  Pt denies drinking eoth. Without buprenorphine, pt would have withdrawals. Normal appetite, no constipation, headache or insomnia. No depressed mood.   PE: No distress, well groomed, eyes: No sclera icterus. Normal pupil size, normal respiratory effort, Lungs: CTA bilaterally, heart: RRR, abdomen: soft, no tenderness, BS+, good balance, good memory, no depressed mood  A/P: Opioid dependence, pt denies any opioid abuse. Pt denies cravings and withdrawals symptoms. I personally reviewed pt's UDS done in office today which was negative for opiate. I reviewed pt's OARRS report. The OARRS showed no sign of physician shopping. Pt's OARRS was filed into pt's medical record. Recommend to avoid alcohol while on buprenorphine. Pt was informed that buprenorphine is addictive. Pt understood that buprenorphine diversion is illegal. Recommend to lock Buprenorphine in a secure place and out of reach of children. Cont counseling for opioid dependence treatment. Pt should not drive or operate machinery if incoordination or confusion occurs. Recommend pt to keep naloxone kits readily available in case of opioid OD. I have considered the risks of abuse, dependence, addiction and diversion. I believe that it is clinically appropriate for the pt to continue buprenorphine treatment. Recommend  the pt have a dental exam with a dentist  twice a year while on buprenorphine. Recommend pt to gently rinse teeth and gums with water after the medicine has completely dissolved.   f/u as scheduled

## 2024-08-09 ENCOUNTER — APPOINTMENT (OUTPATIENT)
Dept: PRIMARY CARE | Facility: CLINIC | Age: 69
End: 2024-08-09
Payer: MEDICARE

## 2024-08-09 VITALS
WEIGHT: 171 LBS | DIASTOLIC BLOOD PRESSURE: 70 MMHG | HEART RATE: 69 BPM | HEIGHT: 65 IN | BODY MASS INDEX: 28.49 KG/M2 | SYSTOLIC BLOOD PRESSURE: 110 MMHG

## 2024-08-09 DIAGNOSIS — E55.9 VITAMIN D DEFICIENCY: ICD-10-CM

## 2024-08-09 DIAGNOSIS — F11.20 UNCOMPLICATED OPIOID DEPENDENCE (MULTI): ICD-10-CM

## 2024-08-09 DIAGNOSIS — Z00.00 MEDICARE ANNUAL WELLNESS VISIT, SUBSEQUENT: ICD-10-CM

## 2024-08-09 DIAGNOSIS — I10 ESSENTIAL HYPERTENSION: ICD-10-CM

## 2024-08-09 DIAGNOSIS — J45.20 MILD INTERMITTENT ASTHMA WITHOUT COMPLICATION (HHS-HCC): ICD-10-CM

## 2024-08-09 DIAGNOSIS — J30.1 SEASONAL ALLERGIC RHINITIS DUE TO POLLEN: Primary | ICD-10-CM

## 2024-08-09 DIAGNOSIS — E78.5 DYSLIPIDEMIA, GOAL LDL BELOW 100: ICD-10-CM

## 2024-08-09 PROCEDURE — 1159F MED LIST DOCD IN RCRD: CPT | Performed by: INTERNAL MEDICINE

## 2024-08-09 PROCEDURE — 3008F BODY MASS INDEX DOCD: CPT | Performed by: INTERNAL MEDICINE

## 2024-08-09 PROCEDURE — 99213 OFFICE O/P EST LOW 20 MIN: CPT | Performed by: INTERNAL MEDICINE

## 2024-08-09 PROCEDURE — 1160F RVW MEDS BY RX/DR IN RCRD: CPT | Performed by: INTERNAL MEDICINE

## 2024-08-09 PROCEDURE — 3074F SYST BP LT 130 MM HG: CPT | Performed by: INTERNAL MEDICINE

## 2024-08-09 PROCEDURE — 1036F TOBACCO NON-USER: CPT | Performed by: INTERNAL MEDICINE

## 2024-08-09 PROCEDURE — 3078F DIAST BP <80 MM HG: CPT | Performed by: INTERNAL MEDICINE

## 2024-08-09 RX ORDER — AZELASTINE 1 MG/ML
1 SPRAY, METERED NASAL 2 TIMES DAILY
Qty: 30 ML | Refills: 12 | Status: SHIPPED | OUTPATIENT
Start: 2024-08-09 | End: 2025-08-09

## 2024-08-09 RX ORDER — SAME BUTANEDISULFONATE/BETAINE 400-600 MG
1 POWDER IN PACKET (EA) ORAL EVERY MORNING
Qty: 90 CAPSULE | Refills: 3 | Status: SHIPPED | OUTPATIENT
Start: 2024-08-09 | End: 2025-08-09

## 2024-08-09 NOTE — ASSESSMENT & PLAN NOTE
Intolerant of fluticasone nasal spray will try azelastine nasal spray continue montelukast 10 mg daily

## 2024-08-09 NOTE — ASSESSMENT & PLAN NOTE
Start 5000 units of vitamin D daily reevaluate with next visit very mild osteopenia on bone density does not require bisphosphonate at this time

## 2024-08-09 NOTE — PROGRESS NOTES
"Subjective   Reason for Visit: Jenna Ca is an 68 y.o. female here for a Medicare Wellness visit.     Past Medical, Surgical, and Family History reviewed and updated in chart.    Reviewed all medications by prescribing practitioner or clinical pharmacist (such as prescriptions, OTCs, herbal therapies and supplements) and documented in the medical record.    HPI    Patient Care Team:  Jared Rojo DO as PCP - General (Internal Medicine)  Miguelito Mota MD PhD as PCP - [employer] PCP     Review of Systems   All other systems reviewed and are negative.      Objective   Vitals:  /70   Pulse 69   Ht 1.651 m (5' 5\")   Wt 77.6 kg (171 lb)   LMP  (LMP Unknown)   BMI 28.46 kg/m²       Physical Exam  Vitals and nursing note reviewed.   Constitutional:       General: She is not in acute distress.     Appearance: Normal appearance. She is well-developed. She is not toxic-appearing.   HENT:      Head: Normocephalic and atraumatic.      Right Ear: Tympanic membrane and external ear normal.      Left Ear: Tympanic membrane and external ear normal.      Nose: Nose normal.      Mouth/Throat:      Mouth: Mucous membranes are moist.      Pharynx: Oropharynx is clear. No oropharyngeal exudate or posterior oropharyngeal erythema.      Tonsils: No tonsillar exudate. 2+ on the right. 2+ on the left.   Eyes:      Extraocular Movements: Extraocular movements intact.      Conjunctiva/sclera: Conjunctivae normal.   Cardiovascular:      Rate and Rhythm: Normal rate and regular rhythm.      Pulses: Normal pulses.      Heart sounds: Normal heart sounds. No murmur heard.  Pulmonary:      Effort: Pulmonary effort is normal.      Breath sounds: Normal breath sounds.   Abdominal:      General: Abdomen is flat. Bowel sounds are normal.      Palpations: Abdomen is soft.   Musculoskeletal:      Cervical back: Neck supple.   Lymphadenopathy:      Cervical: No cervical adenopathy.   Skin:     General: Skin is warm " and dry.      Findings: No rash.   Neurological:      Mental Status: She is alert. Mental status is at baseline.   Psychiatric:         Mood and Affect: Mood normal.         Behavior: Behavior normal.         Thought Content: Thought content normal.         Judgment: Judgment normal.         Assessment/Plan   Problem List Items Addressed This Visit             ICD-10-CM    Essential hypertension I10     Stable blood pressure control on lisinopril 20 mg daily continue reevaluate with lab work         Dyslipidemia, goal LDL below 100 E78.5     Reevaluate lab work on rosuvastatin 5 mg every other day         Relevant Medications    cholecalciferol, vitD3,/vit K2 (vitamin D3-vitamin K2) 125-90 mcg capsule    Other Relevant Orders    CBC and Auto Differential    Comprehensive metabolic panel    Lipid Panel    Vitamin D 25-Hydroxy,Total (for eval of Vitamin D levels)    Follow Up In [facility] - PCP - Established    Allergic rhinitis - Primary J30.9     Intolerant of fluticasone nasal spray will try azelastine nasal spray continue montelukast 10 mg daily         Relevant Medications    azelastine (Astelin) 137 mcg (0.1 %) nasal spray    Other Relevant Orders    Follow Up In [facility] - PCP - Established    Mild intermittent asthma without complication ([facility]) J45.20     Symptoms controlled without exacerbation at this time         Opioid dependence (Multi) F11.20     Continue on Suboxone clinic doing very well managing to follow-up with her scheduled provider         Medicare annual wellness visit, subsequent Z00.00     Last completed February 1, 2024 continue annual         Vitamin D deficiency E55.9     Start 5000 units of vitamin D daily reevaluate with next visit very mild osteopenia on bone density does not require bisphosphonate at this time         Relevant Medications    cholecalciferol, vitD3,/vit K2 (vitamin D3-vitamin K2) 125-90 mcg capsule    Other Relevant Orders    Vitamin D  25-Hydroxy,Total (for eval of Vitamin D levels)    Follow Up In [facility] - PCP - Established

## 2024-08-09 NOTE — PROGRESS NOTES
"Subjective   Patient ID: Jenna Ca is a 68 y.o. female who presents for Follow-up (Follow-up/Take about sinuses).    HPI     Review of Systems    Objective   /85   Pulse 69   Ht 1.651 m (5' 5\")   Wt 77.6 kg (171 lb)   LMP  (LMP Unknown)   BMI 28.46 kg/m²     Physical Exam    Assessment/Plan          " 26-Apr-2022

## 2024-08-30 ENCOUNTER — APPOINTMENT (OUTPATIENT)
Dept: PRIMARY CARE | Facility: CLINIC | Age: 69
End: 2024-08-30
Payer: MEDICARE

## 2024-08-30 VITALS — SYSTOLIC BLOOD PRESSURE: 127 MMHG | DIASTOLIC BLOOD PRESSURE: 73 MMHG

## 2024-08-30 DIAGNOSIS — F11.20 UNCOMPLICATED OPIOID DEPENDENCE (MULTI): ICD-10-CM

## 2024-08-30 PROCEDURE — 80305 DRUG TEST PRSMV DIR OPT OBS: CPT | Performed by: FAMILY MEDICINE

## 2024-08-30 PROCEDURE — 3078F DIAST BP <80 MM HG: CPT | Performed by: FAMILY MEDICINE

## 2024-08-30 PROCEDURE — 99214 OFFICE O/P EST MOD 30 MIN: CPT | Performed by: FAMILY MEDICINE

## 2024-08-30 PROCEDURE — G2211 COMPLEX E/M VISIT ADD ON: HCPCS | Performed by: FAMILY MEDICINE

## 2024-08-30 PROCEDURE — 3074F SYST BP LT 130 MM HG: CPT | Performed by: FAMILY MEDICINE

## 2024-08-30 RX ORDER — BUPRENORPHINE AND NALOXONE 8; 2 MG/1; MG/1
1 FILM, SOLUBLE BUCCAL; SUBLINGUAL DAILY
Qty: 28 FILM | Refills: 0 | Status: SHIPPED | OUTPATIENT
Start: 2024-08-30

## 2024-08-30 NOTE — PROGRESS NOTES
Chief complaint: Opioid dependence treatment  HPI: Pt has been compliant with taking buprenorphine as dir. pt denies drinking eoth. Pt was functional at daily activities. Pt had good relationship with people.  Pt has been attending AA/NA as instructed. Pt stated that buprenorphine treatment controlled cravings for opioids and withdrawals symptoms well. pt denies abusing any opioids. Pt denies depressed mood.  Pt had regular BM. No headache. No drowsiness, confusion or imbalance.   PE:  No distress, well groomed, eyes: No sclera icterus. Normal pupil size, No nasal discharge, no exertional respiration. Lungs: CTA bilaterally, heart: RRR, abdomen: soft, no tenderness, BS+, no depressed mood or flat affect. No HI/SI   Opioid dependence. Pt denies any opioid abuse while on buprenorphine treatment.   I personally reviewed pt's UDS performed in office today.  UDS was negative for opiate. I reviewed pt's updated OARRS report today.  The OARRS report showed that pt filled buprenorphine as prescribed. No physician shopping.  I have considered the risks of abuse, dependence, addiction and diversion. I believe that it is clinically appropriate for the pt to continue buprenorphine treatment. Inform pt to avoid alcohol while on buprenorphine. Informed pt that buprenorphine is addictive. Caution pt that lending buprenorphine is against the law. Recommend pt to keep Buprenorphine out of reach of children. Recommend to keep naloxone available in case of opioid OD. Caution pt that buprenorphine can potentially damage the teeth. Recommend regular dental check up with the dentist. Continue current dose of buprenorphine. RTC as scheduled. Continue counseling. Pt should not drive or operate machinery if incoordination or confusion occurs.

## 2024-09-23 ENCOUNTER — TELEPHONE (OUTPATIENT)
Dept: PRIMARY CARE | Facility: CLINIC | Age: 69
End: 2024-09-23
Payer: MEDICARE

## 2024-09-25 DIAGNOSIS — K64.9 UNSPECIFIED HEMORRHOIDS: ICD-10-CM

## 2024-09-25 RX ORDER — HYDROCORTISONE 25 MG/G
CREAM TOPICAL
Qty: 30 G | Refills: 6 | Status: SHIPPED | OUTPATIENT
Start: 2024-09-25

## 2024-09-25 NOTE — TELEPHONE ENCOUNTER
Patient states she hadn't been taking meds consistently and had refills. She doesn't have anymore refills and wants to know if she can't get this med what would be a substitute she can take

## 2024-09-26 DIAGNOSIS — M51.36 LUMBAR DEGENERATIVE DISC DISEASE: ICD-10-CM

## 2024-09-26 DIAGNOSIS — G89.4 CHRONIC PAIN SYNDROME: Primary | ICD-10-CM

## 2024-09-26 RX ORDER — METAXALONE 800 MG/1
800 TABLET ORAL 2 TIMES DAILY
Qty: 60 TABLET | Refills: 3 | Status: SHIPPED | OUTPATIENT
Start: 2024-09-26

## 2024-09-26 RX ORDER — METAXALONE 800 MG/1
800 TABLET ORAL 2 TIMES DAILY
Qty: 60 TABLET | Refills: 3 | Status: SHIPPED | OUTPATIENT
Start: 2024-09-26 | End: 2024-09-26

## 2024-09-27 ENCOUNTER — APPOINTMENT (OUTPATIENT)
Dept: PRIMARY CARE | Facility: CLINIC | Age: 69
End: 2024-09-27
Payer: MEDICARE

## 2024-09-27 VITALS — DIASTOLIC BLOOD PRESSURE: 75 MMHG | SYSTOLIC BLOOD PRESSURE: 137 MMHG | RESPIRATION RATE: 15 BRPM | HEART RATE: 76 BPM

## 2024-09-27 DIAGNOSIS — M79.18 MYOFASCIAL PAIN SYNDROME, CERVICAL: ICD-10-CM

## 2024-09-27 DIAGNOSIS — F11.20 UNCOMPLICATED OPIOID DEPENDENCE (MULTI): Primary | ICD-10-CM

## 2024-09-27 PROCEDURE — 3078F DIAST BP <80 MM HG: CPT | Performed by: FAMILY MEDICINE

## 2024-09-27 PROCEDURE — 90656 IIV3 VACC NO PRSV 0.5 ML IM: CPT | Performed by: FAMILY MEDICINE

## 2024-09-27 PROCEDURE — G2211 COMPLEX E/M VISIT ADD ON: HCPCS | Performed by: FAMILY MEDICINE

## 2024-09-27 PROCEDURE — G0008 ADMIN INFLUENZA VIRUS VAC: HCPCS | Performed by: FAMILY MEDICINE

## 2024-09-27 PROCEDURE — 20553 NJX 1/MLT TRIGGER POINTS 3/>: CPT | Performed by: FAMILY MEDICINE

## 2024-09-27 PROCEDURE — 99214 OFFICE O/P EST MOD 30 MIN: CPT | Performed by: FAMILY MEDICINE

## 2024-09-27 PROCEDURE — 3075F SYST BP GE 130 - 139MM HG: CPT | Performed by: FAMILY MEDICINE

## 2024-09-27 RX ORDER — BUPRENORPHINE AND NALOXONE 8; 2 MG/1; MG/1
1 FILM, SOLUBLE BUCCAL; SUBLINGUAL DAILY
Qty: 28 FILM | Refills: 0 | Status: SHIPPED | OUTPATIENT
Start: 2024-09-27

## 2024-09-27 NOTE — PROGRESS NOTES
Chief complaint: Opioid dependence treatment  HPI: Pt denies having cravings for opioids or withdrawals symptoms.  Pt denies abusing any opioids. Pt denies drinking eoth. pt has been attending NA/AA meeting. pt has been feeling functional with current dose of buprenorphine tx. Pt was able to perform daily activities well. Pt would have cravings for opioids and withdrawals without buprenorphine treatment. No depressed mood. No drowsiness, headache, imbalance or confusion. Patient noticed the chronic localized muscle spasm at left upper neck area got worse in the past few days. turning neck from side to side made the pain worse.  stretch exercise did not help. no recent heavy lifting or falls. Pt would like to try trigger point  shots. pt controlled BM and bladder well. no ext weakness or numbness.    PE: NAD, Well groomed, eyes: Normal pupil size, no sclera icterus, no nasal discharge, normal  respiratory effort, Lungs: CTA bilaterally, heart: RRR, abdomen: soft, no tenderness, BS+, moderate localized  tenderness at left upper muscles with muscle spasm, no local swelling or erythema. turning neck  from side to side aggravated the pain. normal strength and sensation at ext. normal gait and station. Good mood.  good judgment and insight,  good mood and normal affect. No HI/SI      Opioid dependence, Pt denies having cravings or withdrawals. Pt denies any opioid abuse. Caution patient that transmucosal buprenorphine can increase the risk of  dental problems, including tooth decay, cavities, oral infections, and loss of teeth. Recommend  the pt to see a dentist for dental exam regularly. Recommend pt to gently rinse teeth and gums with water after the medicine has completely dissolved.  Informed pt the alternative medical treatment for opioid dependence such as Probuphine implant, SUBLOCADE injection, vivitrol shot, oral naltrexone pills, In-patient rehabilitation.    The OARRS report was not available today. No alcohol  while on  buprenorphine.  Pt understood that buprenorphine can be addictive too. Inform pt that buprenorphine diversion is illegal.  recommend tapering down the dose of buprenorphine when tolerated. Recommend pt to keep Buprenorphine in a locked place and to keep it out reach of children.  Inform pt to keep naloxone readily available in case of opioid OD. Encourage  counseling. Pt should not drive or operate machinery if incoordination or confusion occurs. I have considered the risks of abuse, dependence, addiction and diversion. I believe that it is clinically appropriate for the pt to continue buprenorphine treatment.  RTC as scheduled  Myofascial pain at  left neck area, pt requested trigger point injection of the area of pain. pt understood potential worse pain, bleeding and infection. pt agrees to have  the injection. Under sterile condition, 3  areas of left neck with tenderness  were  each  injected with 1.5cc  1% lidocaine  in a pepper spray fashion.  total volume of lidocaine used was 4.5ml. pt felt pain relief after the injection. Recommend warm compress. Advise stretch exercise for pain relief.

## 2024-09-29 DIAGNOSIS — K21.9 GASTRO-ESOPHAGEAL REFLUX DISEASE WITHOUT ESOPHAGITIS: ICD-10-CM

## 2024-09-30 RX ORDER — PANTOPRAZOLE SODIUM 40 MG/1
40 TABLET, DELAYED RELEASE ORAL 2 TIMES DAILY
Qty: 180 TABLET | Refills: 3 | Status: SHIPPED | OUTPATIENT
Start: 2024-09-30

## 2024-10-02 ENCOUNTER — TELEPHONE (OUTPATIENT)
Dept: PRIMARY CARE | Facility: CLINIC | Age: 69
End: 2024-10-02
Payer: MEDICARE

## 2024-10-02 NOTE — TELEPHONE ENCOUNTER
Metaxalone 800 mg denied by her insurance.  She is asking if there is something else that can be sent in ?    [facility]

## 2024-10-04 NOTE — TELEPHONE ENCOUNTER
IS there anything else she can takes and she gave me 2 names they are Francisca & Kishore she stated that her ins will cover she kept breaking up on the phone I asked her like 4 times to spell them out

## 2024-10-07 DIAGNOSIS — M19.112 OSTEOARTHRITIS OF SHOULDERS DUE TO ROTATOR CUFF INJURY, BILATERAL: ICD-10-CM

## 2024-10-07 DIAGNOSIS — M19.111 OSTEOARTHRITIS OF SHOULDERS DUE TO ROTATOR CUFF INJURY, BILATERAL: ICD-10-CM

## 2024-10-07 DIAGNOSIS — M79.18 MYOFASCIAL PAIN SYNDROME, CERVICAL: ICD-10-CM

## 2024-10-07 DIAGNOSIS — S46.001S OSTEOARTHRITIS OF SHOULDERS DUE TO ROTATOR CUFF INJURY, BILATERAL: ICD-10-CM

## 2024-10-07 DIAGNOSIS — S46.002S OSTEOARTHRITIS OF SHOULDERS DUE TO ROTATOR CUFF INJURY, BILATERAL: ICD-10-CM

## 2024-10-07 RX ORDER — CYCLOBENZAPRINE HCL 10 MG
10 TABLET ORAL NIGHTLY PRN
Qty: 90 TABLET | Refills: 3 | Status: SHIPPED | OUTPATIENT
Start: 2024-10-07 | End: 2025-10-07

## 2024-10-07 NOTE — TELEPHONE ENCOUNTER
Patient called to follow up pharmacy never received can you please resend     [id number] - [place], [place] - [address]  [address] [address]  Phone: [phone]  Fax: [phone]  MATT #: [id number]

## 2024-10-25 ENCOUNTER — APPOINTMENT (OUTPATIENT)
Dept: PRIMARY CARE | Facility: CLINIC | Age: 69
End: 2024-10-25
Payer: MEDICARE

## 2024-10-25 VITALS — SYSTOLIC BLOOD PRESSURE: 126 MMHG | DIASTOLIC BLOOD PRESSURE: 74 MMHG

## 2024-10-25 DIAGNOSIS — F11.20 UNCOMPLICATED OPIOID DEPENDENCE (MULTI): ICD-10-CM

## 2024-10-25 PROCEDURE — G2211 COMPLEX E/M VISIT ADD ON: HCPCS | Performed by: FAMILY MEDICINE

## 2024-10-25 PROCEDURE — 99214 OFFICE O/P EST MOD 30 MIN: CPT | Performed by: FAMILY MEDICINE

## 2024-10-25 PROCEDURE — 80305 DRUG TEST PRSMV DIR OPT OBS: CPT | Performed by: FAMILY MEDICINE

## 2024-10-25 PROCEDURE — 3074F SYST BP LT 130 MM HG: CPT | Performed by: FAMILY MEDICINE

## 2024-10-25 PROCEDURE — 3078F DIAST BP <80 MM HG: CPT | Performed by: FAMILY MEDICINE

## 2024-10-25 RX ORDER — BUPRENORPHINE AND NALOXONE 8; 2 MG/1; MG/1
1 FILM, SOLUBLE BUCCAL; SUBLINGUAL DAILY
Qty: 28 FILM | Refills: 0 | Status: SHIPPED | OUTPATIENT
Start: 2024-10-25

## 2024-10-25 NOTE — PROGRESS NOTES
Chief complaint: Opioid dependence treatment  HPI: Cravings for opioids and withdrawals symptoms were controlled with current dose of buprenorphine tx. Pt denies abusing any opioids. Patient has been attending meeting/counseling as recommended. Pt felt normal and functional with buprenorphine tx.   Pt had good support. Pt denies drinking eoth. No headache, constipation, imbalance, insomnia, drowsiness or confusion.  Pt would have cravings and withdrawals  without buprenorphine treatment. pt denies having depressed mood. No HI/SI.   PE: No acute distress, well groomed, eyes: normal pupil size, no sclera icterus, Lungs: CTA bilaterally, heart: RRR, abdomen: soft, no tenderness, BS+,  good  mood and normal affect. No HI/SI    A/P:  Opioid dependence, pt feels functional at daily activities while on buprenorphine treatment. Pt has been attending counseling/NA/AA meeting for opioid dependence. Pt denies any opioid abuse. Cravings and withdrawals were controlled. I personally reviewed pt's urine drug screen test done in office today. The urine drug screen was negative for opiate. I reviewed pt's OARRS report today. The OARRS report showed pt filled medications as prescribed.   Inform pt to avoid  alcohol while on buprenorphine tx. I have considered the risks of abuse, dependence, addiction and diversion. I believe that it is clinically appropriate for the pt to continue current buprenorphine treatment. Continue counseling for opioid dependence. Recommend pt to keep  buprenorphine in a secure place and out of reach of children. Inform pt that lending buprenorphine is illegal. Recommend to keep narcan available in case of opioid OD. Caution of potential teeth/gum damage from buprenorphine. Rinse mouth after buprenorphine has completely dissolved. Recommend to taper off buprenorphine if tolerated. Pt should not drive or operate machinery if incoordination or confusion occurs. f/u as scheduled

## 2024-11-07 ENCOUNTER — LAB (OUTPATIENT)
Dept: LAB | Facility: LAB | Age: 69
End: 2024-11-07
Payer: MEDICARE

## 2024-11-07 DIAGNOSIS — Z00.00 MEDICARE ANNUAL WELLNESS VISIT, SUBSEQUENT: ICD-10-CM

## 2024-11-07 DIAGNOSIS — E55.9 VITAMIN D DEFICIENCY: ICD-10-CM

## 2024-11-07 DIAGNOSIS — E78.5 DYSLIPIDEMIA, GOAL LDL BELOW 100: ICD-10-CM

## 2024-11-07 LAB
25(OH)D3 SERPL-MCNC: 23 NG/ML (ref 30–100)
ALBUMIN SERPL BCP-MCNC: 4.2 G/DL (ref 3.4–5)
ALP SERPL-CCNC: 82 U/L (ref 33–136)
ALT SERPL W P-5'-P-CCNC: 20 U/L (ref 7–45)
ANION GAP SERPL CALC-SCNC: 11 MMOL/L (ref 10–20)
AST SERPL W P-5'-P-CCNC: 23 U/L (ref 9–39)
BASOPHILS # BLD AUTO: 0.04 X10*3/UL (ref 0–0.1)
BASOPHILS NFR BLD AUTO: 0.6 %
BILIRUB SERPL-MCNC: 0.6 MG/DL (ref 0–1.2)
BUN SERPL-MCNC: 8 MG/DL (ref 6–23)
CALCIUM SERPL-MCNC: 8.7 MG/DL (ref 8.6–10.3)
CHLORIDE SERPL-SCNC: 105 MMOL/L (ref 98–107)
CHOLEST SERPL-MCNC: 155 MG/DL (ref 0–199)
CHOLESTEROL/HDL RATIO: 3.6
CO2 SERPL-SCNC: 28 MMOL/L (ref 21–32)
CREAT SERPL-MCNC: 0.75 MG/DL (ref 0.5–1.05)
EGFRCR SERPLBLD CKD-EPI 2021: 87 ML/MIN/1.73M*2
EOSINOPHIL # BLD AUTO: 0.23 X10*3/UL (ref 0–0.7)
EOSINOPHIL NFR BLD AUTO: 3.3 %
ERYTHROCYTE [DISTWIDTH] IN BLOOD BY AUTOMATED COUNT: 13.1 % (ref 11.5–14.5)
GLUCOSE SERPL-MCNC: 106 MG/DL (ref 74–99)
HCT VFR BLD AUTO: 38.3 % (ref 36–46)
HDLC SERPL-MCNC: 43.5 MG/DL
HGB BLD-MCNC: 11.9 G/DL (ref 12–16)
IMM GRANULOCYTES # BLD AUTO: 0.01 X10*3/UL (ref 0–0.7)
IMM GRANULOCYTES NFR BLD AUTO: 0.1 % (ref 0–0.9)
LDLC SERPL CALC-MCNC: 83 MG/DL
LYMPHOCYTES # BLD AUTO: 2.39 X10*3/UL (ref 1.2–4.8)
LYMPHOCYTES NFR BLD AUTO: 34.1 %
MCH RBC QN AUTO: 27.5 PG (ref 26–34)
MCHC RBC AUTO-ENTMCNC: 31.1 G/DL (ref 32–36)
MCV RBC AUTO: 89 FL (ref 80–100)
MONOCYTES # BLD AUTO: 0.62 X10*3/UL (ref 0.1–1)
MONOCYTES NFR BLD AUTO: 8.8 %
NEUTROPHILS # BLD AUTO: 3.72 X10*3/UL (ref 1.2–7.7)
NEUTROPHILS NFR BLD AUTO: 53.1 %
NON HDL CHOLESTEROL: 112 MG/DL (ref 0–149)
NRBC BLD-RTO: 0 /100 WBCS (ref 0–0)
PLATELET # BLD AUTO: 318 X10*3/UL (ref 150–450)
POTASSIUM SERPL-SCNC: 3.8 MMOL/L (ref 3.5–5.3)
PROT SERPL-MCNC: 6.5 G/DL (ref 6.4–8.2)
RBC # BLD AUTO: 4.33 X10*6/UL (ref 4–5.2)
SODIUM SERPL-SCNC: 140 MMOL/L (ref 136–145)
TRIGL SERPL-MCNC: 141 MG/DL (ref 0–149)
VLDL: 28 MG/DL (ref 0–40)
WBC # BLD AUTO: 7 X10*3/UL (ref 4.4–11.3)

## 2024-11-07 PROCEDURE — 80061 LIPID PANEL: CPT

## 2024-11-07 PROCEDURE — 36415 COLL VENOUS BLD VENIPUNCTURE: CPT

## 2024-11-07 PROCEDURE — 80053 COMPREHEN METABOLIC PANEL: CPT

## 2024-11-07 PROCEDURE — 82306 VITAMIN D 25 HYDROXY: CPT

## 2024-11-07 PROCEDURE — 85025 COMPLETE CBC W/AUTO DIFF WBC: CPT

## 2024-11-12 ENCOUNTER — APPOINTMENT (OUTPATIENT)
Dept: PRIMARY CARE | Facility: CLINIC | Age: 69
End: 2024-11-12
Payer: MEDICARE

## 2024-11-12 VITALS
HEART RATE: 78 BPM | DIASTOLIC BLOOD PRESSURE: 80 MMHG | BODY MASS INDEX: 28.32 KG/M2 | WEIGHT: 170 LBS | SYSTOLIC BLOOD PRESSURE: 122 MMHG | HEIGHT: 65 IN

## 2024-11-12 DIAGNOSIS — E78.5 DYSLIPIDEMIA, GOAL LDL BELOW 100: ICD-10-CM

## 2024-11-12 DIAGNOSIS — I10 ESSENTIAL HYPERTENSION: Primary | ICD-10-CM

## 2024-11-12 DIAGNOSIS — S46.001S OSTEOARTHRITIS OF SHOULDERS DUE TO ROTATOR CUFF INJURY, BILATERAL: ICD-10-CM

## 2024-11-12 DIAGNOSIS — E55.9 VITAMIN D DEFICIENCY: ICD-10-CM

## 2024-11-12 DIAGNOSIS — M19.111 OSTEOARTHRITIS OF SHOULDERS DUE TO ROTATOR CUFF INJURY, BILATERAL: ICD-10-CM

## 2024-11-12 DIAGNOSIS — M19.112 OSTEOARTHRITIS OF SHOULDERS DUE TO ROTATOR CUFF INJURY, BILATERAL: ICD-10-CM

## 2024-11-12 DIAGNOSIS — J30.1 SEASONAL ALLERGIC RHINITIS DUE TO POLLEN: ICD-10-CM

## 2024-11-12 DIAGNOSIS — Z12.31 SCREENING MAMMOGRAM FOR BREAST CANCER: ICD-10-CM

## 2024-11-12 DIAGNOSIS — M85.89 OSTEOPENIA OF MULTIPLE SITES: ICD-10-CM

## 2024-11-12 DIAGNOSIS — R73.02 IGT (IMPAIRED GLUCOSE TOLERANCE): ICD-10-CM

## 2024-11-12 DIAGNOSIS — J45.20 MILD INTERMITTENT ASTHMA WITHOUT COMPLICATION (HHS-HCC): ICD-10-CM

## 2024-11-12 DIAGNOSIS — S46.002S OSTEOARTHRITIS OF SHOULDERS DUE TO ROTATOR CUFF INJURY, BILATERAL: ICD-10-CM

## 2024-11-12 DIAGNOSIS — M79.18 MYOFASCIAL PAIN SYNDROME, CERVICAL: ICD-10-CM

## 2024-11-12 PROCEDURE — 1160F RVW MEDS BY RX/DR IN RCRD: CPT | Performed by: INTERNAL MEDICINE

## 2024-11-12 PROCEDURE — 99213 OFFICE O/P EST LOW 20 MIN: CPT | Performed by: INTERNAL MEDICINE

## 2024-11-12 PROCEDURE — 3074F SYST BP LT 130 MM HG: CPT | Performed by: INTERNAL MEDICINE

## 2024-11-12 PROCEDURE — 3079F DIAST BP 80-89 MM HG: CPT | Performed by: INTERNAL MEDICINE

## 2024-11-12 PROCEDURE — 3008F BODY MASS INDEX DOCD: CPT | Performed by: INTERNAL MEDICINE

## 2024-11-12 PROCEDURE — 1036F TOBACCO NON-USER: CPT | Performed by: INTERNAL MEDICINE

## 2024-11-12 PROCEDURE — 1159F MED LIST DOCD IN RCRD: CPT | Performed by: INTERNAL MEDICINE

## 2024-11-12 RX ORDER — CYCLOBENZAPRINE HCL 10 MG
10 TABLET ORAL 2 TIMES DAILY
Qty: 180 TABLET | Refills: 3 | Status: SHIPPED | OUTPATIENT
Start: 2024-11-12 | End: 2025-11-12

## 2024-11-12 RX ORDER — PREDNISONE 20 MG/1
TABLET ORAL
Qty: 18 TABLET | Refills: 0 | Status: SHIPPED | OUTPATIENT
Start: 2024-11-12

## 2024-11-12 NOTE — ASSESSMENT & PLAN NOTE
Vitamin D levels improved at 23 but still low continue with vitamin D supplementation consider increasing vitamin D3 K2 2 capsules daily  Orders:    Follow Up In [facility] - PCP - Established    Vitamin D 25-Hydroxy,Total (for eval of Vitamin D levels); Future

## 2024-11-12 NOTE — ASSESSMENT & PLAN NOTE
Stable at this time.  Consideration for corticosteroid injections on a as needed basis  Orders:    cyclobenzaprine (Flexeril) 10 mg tablet; Take 1 tablet (10 mg) by mouth 2 times a day.

## 2024-11-12 NOTE — ASSESSMENT & PLAN NOTE
Continue montelukast 10 mg daily with fluticasone nasal spray 2 sprays per nostril daily and azelastine 1 spray per nostril twice a day  Orders:    Follow Up In [facility] - PCP - Established

## 2024-11-12 NOTE — ASSESSMENT & PLAN NOTE
Optimal LDL cholesterol of 83 continue rosuvastatin 5 mg a day every other day  Orders:    Follow Up In [facility] - PCP - [facility]    Follow Up In [facility] - PCP - Medicare Annual; Future    Comprehensive Metabolic Panel; Future    Hemoglobin A1C; Future    Lipid Panel; Future    Albumin-Creatinine Ratio, Urine Random; Future    BI mammo bilateral screening tomosynthesis; Future    XR DEXA bone density; Future

## 2024-11-12 NOTE — ASSESSMENT & PLAN NOTE
Currently treated with course of steroids at this time with prednisone taper continue with use of bronchodilator if symptoms persist or do not improve consider reevaluation of asthma course may consider inhaled corticosteroid long-acting bronchodilator encourage vaccination with COVID vaccination and RSV vaccination given influenza vaccination  Orders:    predniSONE (Deltasone) 20 mg tablet; Take 3 tabs (60mg) daily for 3 days, then take 2 tabs (40mg) daily for 3 days, then take 1 tab (20mg) daily for 3 days.

## 2024-11-12 NOTE — PROGRESS NOTES
"Subjective   Patient ID: Jenna Ca is a 68 y.o. female who presents for Follow-up.    HPI     Review of Systems    Objective   /80   Pulse 78   Ht 1.651 m (5' 5\")   Wt 77.1 kg (170 lb)   LMP  (LMP Unknown)   BMI 28.29 kg/m²     Physical Exam    Assessment/Plan          "

## 2024-11-12 NOTE — ASSESSMENT & PLAN NOTE
Increase cyclobenzaprine replacing metaxalone 10 mg twice a day  Orders:    cyclobenzaprine (Flexeril) 10 mg tablet; Take 1 tablet (10 mg) by mouth 2 times a day.

## 2024-11-12 NOTE — PROGRESS NOTES
"Subjective   Reason for Visit: Jenna Ca is an 68 y.o. female here for a fu  visit.     Past Medical, Surgical, and Family History reviewed and updated in chart.    Reviewed all medications by prescribing practitioner or clinical pharmacist (such as prescriptions, OTCs, herbal therapies and supplements) and documented in the medical record.    HPI    Patient Care Team:  Jared Rojo DO as PCP - General (Internal Medicine)  Miguelito Mota MD PhD as PCP - [employer] PCP     Review of Systems   All other systems reviewed and are negative.      Objective   Vitals:  /80   Pulse 78   Ht 1.651 m (5' 5\")   Wt 77.1 kg (170 lb)   LMP  (LMP Unknown)   BMI 28.29 kg/m²       Physical Exam  Vitals and nursing note reviewed.   Constitutional:       General: She is not in acute distress.     Appearance: Normal appearance. She is well-developed. She is not toxic-appearing.   HENT:      Head: Normocephalic and atraumatic.      Right Ear: Tympanic membrane and external ear normal.      Left Ear: Tympanic membrane and external ear normal.      Nose: Nose normal.      Mouth/Throat:      Mouth: Mucous membranes are moist.      Pharynx: Oropharynx is clear. No oropharyngeal exudate or posterior oropharyngeal erythema.      Tonsils: No tonsillar exudate. 2+ on the right. 2+ on the left.   Eyes:      Extraocular Movements: Extraocular movements intact.      Conjunctiva/sclera: Conjunctivae normal.   Cardiovascular:      Rate and Rhythm: Normal rate and regular rhythm.      Pulses: Normal pulses.      Heart sounds: Normal heart sounds. No murmur heard.  Pulmonary:      Effort: Pulmonary effort is normal.      Breath sounds: Normal breath sounds.   Abdominal:      General: Abdomen is flat. Bowel sounds are normal.      Palpations: Abdomen is soft.   Musculoskeletal:      Cervical back: Neck supple.   Lymphadenopathy:      Cervical: No cervical adenopathy.   Skin:     General: Skin is warm and dry.      " Findings: No rash.   Neurological:      Mental Status: She is alert. Mental status is at baseline.   Psychiatric:         Mood and Affect: Mood normal.         Behavior: Behavior normal.         Thought Content: Thought content normal.         Judgment: Judgment normal.     Lifestyle Recommendations  I recommend a whole-food plant-based diet, an eating pattern that encourages the consumption of unrefined plant foods (such as fruits, vegetables, tubers, whole grains, legumes, nuts and seeds) and discourages meats, dairy products, eggs and processed foods.     The AHA/ACC recommends that the patient consume a dietary pattern that emphasizes intake of vegetables, fruits, and whole grains; includes low-fat dairy products, poultry, fish, legumes, non-tropical vegetable oils, and nuts; and limits intake of sodium, sweets, sugar-sweetened beverages, and red meats.  Adapt this dietary pattern to appropriate calorie requirements (a 500-750 kcal/day deficit to loose weight), personal and cultural food preferences, and nutrition therapy for other medical conditions (including diabetes).  Achieve this pattern by following plans such as the [place] Mediterranean, DASH dietary pattern, or AHA diet.     Engage in 2 hours and 30 minutes per week of moderate-intensity physical activity, or 1 hour and 15 minutes (75 minutes) per week of vigorous-intensity aerobic physical activity, or an equivalent combination of moderate and vigorous-intensity aerobic physical activity. Aerobic activity should be performed in episodes of at least 10 minutes preferably spread throughout the week.     Adhering to a heart healthy diet, regular exercise habits, avoidance of tobacco products, and maintenance of a healthy weight are crucial components of their heart disease risk reduction.    Assessment & Plan  Seasonal allergic rhinitis due to pollen  Continue montelukast 10 mg daily with fluticasone nasal spray 2 sprays per nostril daily and azelastine 1  spray per nostril twice a day  Orders:    Follow Up In [facility] - PCP - Established    Dyslipidemia, goal LDL below 100  Optimal LDL cholesterol of 83 continue rosuvastatin 5 mg a day every other day  Orders:    Follow Up [facility] - PCP - Established    Follow Up In [facility] - PCP - Medicare Annual; Future    Comprehensive Metabolic Panel; Future    Hemoglobin A1C; Future    Lipid Panel; Future    Albumin-Creatinine Ratio, Urine Random; Future    BI mammo bilateral screening tomosynthesis; Future    XR DEXA bone density; Future    Vitamin D deficiency  Vitamin D levels improved at 23 but still low continue with vitamin D supplementation consider increasing vitamin D3 K2 2 capsules daily  Orders:    Follow Up In [facility] - PCP - Established    Vitamin D 25-Hydroxy,Total (for eval of Vitamin D levels); Future    Myofascial pain syndrome, cervical  Increase cyclobenzaprine replacing metaxalone 10 mg twice a day  Orders:    cyclobenzaprine (Flexeril) 10 mg tablet; Take 1 tablet (10 mg) by mouth 2 times a day.    Osteoarthritis of shoulders due to rotator cuff injury, bilateral  Stable at this time.  Consideration for corticosteroid injections on a as needed basis  Orders:    cyclobenzaprine (Flexeril) 10 mg tablet; Take 1 tablet (10 mg) by mouth 2 times a day.    Essential hypertension  Blood pressure stable with lisinopril 20 mg daily kidney function stable GFR 87       Mild intermittent asthma without complication ([facility])  Currently treated with course of steroids at this time with prednisone taper continue with use of bronchodilator if symptoms persist or do not improve consider reevaluation of asthma course may consider inhaled corticosteroid long-acting bronchodilator encourage vaccination with COVID vaccination and RSV vaccination given influenza vaccination  Orders:    predniSONE (Deltasone) 20 mg tablet; Take 3 tabs (60mg) daily for 3 days, then take 2  tabs (40mg) daily for 3 days, then take 1 tab (20mg) daily for 3 days.    IGT (impaired glucose tolerance)    Orders:    Follow Up In [facility] - PCP - Medicare Annual; Future    Comprehensive Metabolic Panel; Future    Hemoglobin A1C; Future    Lipid Panel; Future    Albumin-Creatinine Ratio, Urine Random; Future    Screening mammogram for breast cancer    Orders:    BI mammo bilateral screening tomosynthesis; Future    Osteopenia of multiple sites    Orders:    XR DEXA bone density; Future

## 2024-11-22 ENCOUNTER — APPOINTMENT (OUTPATIENT)
Dept: PRIMARY CARE | Facility: CLINIC | Age: 69
End: 2024-11-22
Payer: MEDICARE

## 2024-11-22 VITALS — DIASTOLIC BLOOD PRESSURE: 62 MMHG | SYSTOLIC BLOOD PRESSURE: 128 MMHG

## 2024-11-22 DIAGNOSIS — F11.20 UNCOMPLICATED OPIOID DEPENDENCE (MULTI): ICD-10-CM

## 2024-11-22 LAB
POC AMPHETAMINE: NEGATIVE NG/ML
POC COCAINE: NEGATIVE NG/ML
POC METHAMPHETAMINE: NEGATIVE NG/ML
POC OPIATES: NEGATIVE NG/ML
POC PHENCYCLIDINE (PCP): NEGATIVE NG/ML
POC THC: NEGATIVE NG/ML

## 2024-11-22 PROCEDURE — 3074F SYST BP LT 130 MM HG: CPT | Performed by: FAMILY MEDICINE

## 2024-11-22 PROCEDURE — 3078F DIAST BP <80 MM HG: CPT | Performed by: FAMILY MEDICINE

## 2024-11-22 PROCEDURE — 99214 OFFICE O/P EST MOD 30 MIN: CPT | Performed by: FAMILY MEDICINE

## 2024-11-22 PROCEDURE — G2211 COMPLEX E/M VISIT ADD ON: HCPCS | Performed by: FAMILY MEDICINE

## 2024-11-22 PROCEDURE — 80305 DRUG TEST PRSMV DIR OPT OBS: CPT | Performed by: FAMILY MEDICINE

## 2024-11-22 RX ORDER — BUPRENORPHINE AND NALOXONE 8; 2 MG/1; MG/1
1 FILM, SOLUBLE BUCCAL; SUBLINGUAL DAILY
Qty: 28 FILM | Refills: 0 | Status: SHIPPED | OUTPATIENT
Start: 2024-11-22

## 2024-11-22 NOTE — PROGRESS NOTES
Chief complaint: Opioid dependence treatment  HPI: Pt stated that current dose of buprenorphine controlled cravings for opioids and withdrawals symptoms well. Pt feels normal and performs daily activities well.  Pt denies abusing any opioids. pt has been having counseling. pt denies drinking alcohol. No SE from buprenorphine  such as HA, constipation, imbalance or confusion. Pt sleeps well most night. Pt would have cravings and withdrawals without buprenorphine treatment. Pt denies depressed mood.   PE: No acute distress, well groomed, eyes: no sclera icterus, no exertional respiration, Lungs: CTA bilaterally, heart: RRR, abdomen: soft, no tenderness, BS+,  normal mood and affect. No HI/SI   A/P: Opioid dependence, Pt stated that current dose of buprenorphine treatment controlled cravings and withdrawals well. Pt denies any opioid abuse.   I personally reviewed pt's urine drug screen done in office today.  The urine drug screen was negative for opiate. I reviewed pt's updated OARRS report today.  The OARRS report showed that pt filled buprenorphine consistently as prescribed. No alcohol  while on buprenorphine. Pt understood that buprenorphine is addictive and lending buprenorphine is illegal. Recommend pt to lock  buprenorphine in a safe place and  out of reach of children. Recommend to keep naloxone available in case of opioid OD. Cont counseling for opioid dependence. Pt should not drive or operate machinery if incoordination or confusion occurs. I have considered the risks of abuse, dependence, addiction and diversion. I believe that it is clinically appropriate for the pt to continue current buprenorphine treatment.  RTC as scheduled

## 2024-12-13 ENCOUNTER — APPOINTMENT (OUTPATIENT)
Dept: PRIMARY CARE | Facility: CLINIC | Age: 69
End: 2024-12-13
Payer: MEDICARE

## 2024-12-13 VITALS — DIASTOLIC BLOOD PRESSURE: 67 MMHG | SYSTOLIC BLOOD PRESSURE: 122 MMHG

## 2024-12-13 DIAGNOSIS — F11.20 UNCOMPLICATED OPIOID DEPENDENCE (MULTI): ICD-10-CM

## 2024-12-13 PROCEDURE — 99213 OFFICE O/P EST LOW 20 MIN: CPT | Performed by: FAMILY MEDICINE

## 2024-12-13 PROCEDURE — G2211 COMPLEX E/M VISIT ADD ON: HCPCS | Performed by: FAMILY MEDICINE

## 2024-12-13 PROCEDURE — 3078F DIAST BP <80 MM HG: CPT | Performed by: FAMILY MEDICINE

## 2024-12-13 PROCEDURE — 3074F SYST BP LT 130 MM HG: CPT | Performed by: FAMILY MEDICINE

## 2024-12-13 RX ORDER — BUPRENORPHINE AND NALOXONE 8; 2 MG/1; MG/1
1 FILM, SOLUBLE BUCCAL; SUBLINGUAL DAILY
Qty: 28 FILM | Refills: 0 | Status: SHIPPED | OUTPATIENT
Start: 2024-12-13

## 2024-12-13 NOTE — PROGRESS NOTES
Chief complaint: Opioid dependence treatment  HPI: Pt denies cravings for opioids and withdrawals symptoms. Pt feels normal and functional.  Pt denies abusing any opioids. pt has been having counseling. No HA, constipation or confusion.  Pt denies depressed mood.   PE: No acute distress, eyes: no pupil enlargement or sclera icterus, normal respiration, Lungs: CTA bilaterally, heart: RRR, abdomen: soft, no tenderness, BS+,  good  mood   A/P: Opioid dependence, no cravings and withdrawals. Pt denies any opioid abuse.   OARRS report showed that pt filled buprenorphine consistently as prescribed. Pt understood that buprenorphine is addictive and lending buprenorphine is illegal. Recommend pt to keep  buprenorphine in a safe place. Recommend to keep naloxone available in case of opioid OD. Cont counseling for opioid dependence. Pt should not drive or operate machinery if incoordination or confusion occurs. I have considered the risks of abuse, dependence, addiction and diversion. I believe that it is clinically appropriate for the pt to continue current buprenorphine treatment.  RTC as scheduled

## 2024-12-18 ENCOUNTER — TELEPHONE (OUTPATIENT)
Dept: PRIMARY CARE | Facility: CLINIC | Age: 69
End: 2024-12-18
Payer: MEDICARE

## 2024-12-18 DIAGNOSIS — M19.112 OSTEOARTHRITIS OF SHOULDERS DUE TO ROTATOR CUFF INJURY, BILATERAL: ICD-10-CM

## 2024-12-18 DIAGNOSIS — S46.001S OSTEOARTHRITIS OF SHOULDERS DUE TO ROTATOR CUFF INJURY, BILATERAL: ICD-10-CM

## 2024-12-18 DIAGNOSIS — S46.002S OSTEOARTHRITIS OF SHOULDERS DUE TO ROTATOR CUFF INJURY, BILATERAL: ICD-10-CM

## 2024-12-18 DIAGNOSIS — M19.111 OSTEOARTHRITIS OF SHOULDERS DUE TO ROTATOR CUFF INJURY, BILATERAL: ICD-10-CM

## 2024-12-18 DIAGNOSIS — M79.18 MYOFASCIAL PAIN SYNDROME, CERVICAL: ICD-10-CM

## 2024-12-18 RX ORDER — CYCLOBENZAPRINE HCL 10 MG
10 TABLET ORAL 2 TIMES DAILY
Qty: 180 TABLET | Refills: 3 | Status: SHIPPED | OUTPATIENT
Start: 2024-12-18 | End: 2025-12-18

## 2024-12-18 NOTE — TELEPHONE ENCOUNTER
She called and needs a auth on the Cyclobenzaorine 10 mg please advise her pharmacy [facility]  in [place] needs it for her [employer] company please advise and if you have any questions please call her at [phone]

## 2024-12-18 NOTE — TELEPHONE ENCOUNTER
She called and needs a auth on the Cyclobenzaorine 10 mg please advise her pharmacy [facility] [place] needs it for her [employer] company please advise and if you have any questions please call her at [phone]

## 2025-01-17 ENCOUNTER — APPOINTMENT (OUTPATIENT)
Dept: PRIMARY CARE | Facility: CLINIC | Age: 70
End: 2025-01-17
Payer: MEDICARE

## 2025-01-17 VITALS — DIASTOLIC BLOOD PRESSURE: 67 MMHG | SYSTOLIC BLOOD PRESSURE: 126 MMHG

## 2025-01-17 DIAGNOSIS — F11.20 UNCOMPLICATED OPIOID DEPENDENCE (MULTI): ICD-10-CM

## 2025-01-17 PROCEDURE — 3074F SYST BP LT 130 MM HG: CPT | Performed by: FAMILY MEDICINE

## 2025-01-17 PROCEDURE — 3078F DIAST BP <80 MM HG: CPT | Performed by: FAMILY MEDICINE

## 2025-01-17 PROCEDURE — 99214 OFFICE O/P EST MOD 30 MIN: CPT | Performed by: FAMILY MEDICINE

## 2025-01-17 PROCEDURE — G2211 COMPLEX E/M VISIT ADD ON: HCPCS | Performed by: FAMILY MEDICINE

## 2025-01-17 PROCEDURE — 80305 DRUG TEST PRSMV DIR OPT OBS: CPT | Performed by: FAMILY MEDICINE

## 2025-01-17 RX ORDER — BUPRENORPHINE AND NALOXONE 8; 2 MG/1; MG/1
1 FILM, SOLUBLE BUCCAL; SUBLINGUAL DAILY
Qty: 28 FILM | Refills: 0 | Status: SHIPPED | OUTPATIENT
Start: 2025-01-17

## 2025-01-17 NOTE — PROGRESS NOTES
Chief complaint: Opioid dependence treatment  Pt performs daily activities well with current buprenorphine treatment.  Cravings for opioids and withdrawals symptoms were controlled.  Pt denies abusing any opioids.Pt denies drinking eoth. Pt has been attending meeting or counseling for opioid dependence. Pt has been taking buprenorphine as dir. Pt denies having headache, constipation, mental cloudiness, fatigue, insomnia, drowsiness. Pt would have cravings and withdrawals without buprenorphine treatment. Pt has had good mood. No HI/SI.   PE: No acute distress, well groomed, eyes: normal pupil size, no sclera icterus, normal respiration effort. Lungs: CTA bilaterally, heart: RRR, abdomen: soft, no tenderness, normal BS, Good mood and normal affect. No HI/SI  A/P: Opioid dependence, Pt denies any opioid abuse. Cravings for opioids and withdrawals were controlled.    I personally reviewed pt's urine drug screen test performed in office today.  The urine drug screen was negative for opiate. I reviewed pt's updated OARRS report.  The OARRS report showed that pt filled buprenorphine as prescribed. No opioid shopping.   Pt understood to avoid use  Eoth while on buprenorphine due to potential interactions causing death. Inform pt that buprenorphine is addictive. Inform pt that selling or giving away buprenorphine is against the law. Recommend pt to keep buprenorphine out of reach of children. Recommend to keep naloxone kits available in case of opioid OD. Recommend  counseling for opioid dependence. Pt should not drive or operate machinery if incoordination or confusion occurs. I have considered the risks of abuse, dependence, addiction and diversion. I believe that it is clinically appropriate for the pt to continue buprenorphine treatment. Caution patient that transmucosal buprenorphine can increase the risk of tooth decay, cavities, oral infections, and loss of teeth. Recommend the pt to see a dentist for dental exam  regularly. Recommend pt to gently rinse teeth and gums with water after the medicine has completely dissolved. f/u as scheduled.

## 2025-01-25 DIAGNOSIS — Z00.00 MEDICARE ANNUAL WELLNESS VISIT, SUBSEQUENT: ICD-10-CM

## 2025-01-27 RX ORDER — ROSUVASTATIN CALCIUM 5 MG/1
5 TABLET, COATED ORAL EVERY OTHER DAY
Qty: 45 TABLET | Refills: 3 | Status: SHIPPED | OUTPATIENT
Start: 2025-01-27

## 2025-02-11 ENCOUNTER — TELEPHONE (OUTPATIENT)
Dept: PRIMARY CARE | Facility: CLINIC | Age: 70
End: 2025-02-11
Payer: MEDICARE

## 2025-02-11 DIAGNOSIS — J45.20 MILD INTERMITTENT ASTHMA WITHOUT COMPLICATION (HHS-HCC): Primary | ICD-10-CM

## 2025-02-11 RX ORDER — ALBUTEROL SULFATE 90 UG/1
1 INHALANT RESPIRATORY (INHALATION) EVERY 6 HOURS PRN
Qty: 18 G | Refills: 3 | Status: SHIPPED | OUTPATIENT
Start: 2025-02-11

## 2025-02-11 NOTE — TELEPHONE ENCOUNTER
Patient called in for refill of Albuterol. I can't seem to find it in her meds.  [facility] [place] [facility]  Sched 5/12

## 2025-02-14 ENCOUNTER — APPOINTMENT (OUTPATIENT)
Dept: PRIMARY CARE | Facility: CLINIC | Age: 70
End: 2025-02-14
Payer: MEDICARE

## 2025-02-14 VITALS — SYSTOLIC BLOOD PRESSURE: 132 MMHG | DIASTOLIC BLOOD PRESSURE: 82 MMHG

## 2025-02-14 DIAGNOSIS — F11.20 UNCOMPLICATED OPIOID DEPENDENCE (MULTI): ICD-10-CM

## 2025-02-14 PROCEDURE — 99214 OFFICE O/P EST MOD 30 MIN: CPT | Performed by: FAMILY MEDICINE

## 2025-02-14 PROCEDURE — G2211 COMPLEX E/M VISIT ADD ON: HCPCS | Performed by: FAMILY MEDICINE

## 2025-02-14 PROCEDURE — 3079F DIAST BP 80-89 MM HG: CPT | Performed by: FAMILY MEDICINE

## 2025-02-14 PROCEDURE — 3075F SYST BP GE 130 - 139MM HG: CPT | Performed by: FAMILY MEDICINE

## 2025-02-14 PROCEDURE — 80305 DRUG TEST PRSMV DIR OPT OBS: CPT | Performed by: FAMILY MEDICINE

## 2025-02-14 RX ORDER — BUPRENORPHINE AND NALOXONE 8; 2 MG/1; MG/1
1 FILM, SOLUBLE BUCCAL; SUBLINGUAL DAILY
Qty: 28 FILM | Refills: 0 | Status: SHIPPED | OUTPATIENT
Start: 2025-02-14

## 2025-02-14 NOTE — PROGRESS NOTES
Chief complaint: Opioid dependence treatment  HPI: Cravings for opioids and withdrawals symptoms were controlled with current dose of buprenorphine tx. Pt denies abusing any opioids. Patient has been attending meeting/counseling as recommended. Pt felt  functional with buprenorphine tx. Pt was able to maintain good relationship with other people.  Pt had good support. Pt denies drinking eoth. No headache, constipation,  insomnia, drowsiness or confusion. Pt would have cravings and withdrawals without buprenorphine treatment. pt denies having depressed mood. No HI/SI.   PE: No acute distress, well groomed, eyes: normal pupil size, no sclera icterus, Lungs: CTA bilaterally, heart: RRR, abdomen: soft, no tenderness, BS+, good mood and normal affect. No HI/SI    A/P:   Opioid dependence, pt feels functional at daily activities while on buprenorphine treatment. Pt has been attending counseling/NA/AA meeting for opioid dependence. Pt denies any opioid abuse. Cravings and withdrawals were controlled. I personally reviewed pt's urine drug screen test done in office today. The urine drug screen was negative for opiate. I reviewed pt's OARRS report today. The OARRS report showed pt filled medications as prescribed.   No alcohol while on buprenorphine tx. I have considered the risks of abuse, dependence, addiction and diversion. I believe that it is clinically appropriate for the pt to continue current buprenorphine treatment. Continue counseling for opioid dependence. Recommend pt to keep  buprenorphine in a secure place and out of reach of children. Inform pt that lending buprenorphine is illegal. Recommend to keep narcan available in case of opioid OD. Rinse mouth after taking buprenorphine each time.  Pt should not drive or operate machinery if incoordination or confusion occurs. f/u as scheduled

## 2025-03-14 ENCOUNTER — APPOINTMENT (OUTPATIENT)
Dept: PRIMARY CARE | Facility: CLINIC | Age: 70
End: 2025-03-14
Payer: MEDICARE

## 2025-03-14 VITALS — DIASTOLIC BLOOD PRESSURE: 75 MMHG | SYSTOLIC BLOOD PRESSURE: 125 MMHG

## 2025-03-14 DIAGNOSIS — F11.20 UNCOMPLICATED OPIOID DEPENDENCE (MULTI): ICD-10-CM

## 2025-03-14 PROCEDURE — 3078F DIAST BP <80 MM HG: CPT | Performed by: FAMILY MEDICINE

## 2025-03-14 PROCEDURE — 3074F SYST BP LT 130 MM HG: CPT | Performed by: FAMILY MEDICINE

## 2025-03-14 PROCEDURE — 99214 OFFICE O/P EST MOD 30 MIN: CPT | Performed by: FAMILY MEDICINE

## 2025-03-14 PROCEDURE — 80305 DRUG TEST PRSMV DIR OPT OBS: CPT | Performed by: FAMILY MEDICINE

## 2025-03-14 PROCEDURE — G2211 COMPLEX E/M VISIT ADD ON: HCPCS | Performed by: FAMILY MEDICINE

## 2025-03-14 RX ORDER — BUPRENORPHINE AND NALOXONE 8; 2 MG/1; MG/1
1 FILM, SOLUBLE BUCCAL; SUBLINGUAL DAILY
Qty: 28 FILM | Refills: 0 | Status: SHIPPED | OUTPATIENT
Start: 2025-03-14

## 2025-03-14 NOTE — PROGRESS NOTES
Chief complaint: Opioid dependence treatment  HPI: Pt denies abusing any opioids. Patient has been attending meeting/counseling as recommended. No cravings for opioids and withdrawals symptoms. Pt felt  normal and functional. Pt denies drinking eoth. stable  constipation,  no insomnia, drowsiness or confusion. Pt would have cravings and withdrawals without buprenorphine treatment. pt denies having depressed mood.   PE: No acute distress, well groomed, eyes: normal pupil size, no sclera icterus, Lungs: CTA bilaterally, heart: RRR, abdomen: soft, no tenderness, BS+, no depressed mood. No HI/SI    A/P:   Opioid dependence, Cravings and withdrawals were controlled. Pt denies any opioid abuse.   pt feels normal while on buprenorphine treatment. Pt has been attending counseling/NA/AA meeting for opioid dependence. Continue counseling for opioid dependence.  I personally reviewed pt's urine drug screen test done in office today. The urine drug screen was negative for opiate.   I reviewed pt's OARRS report today. No physician shopping. Caution pt to avoid alcohol while on buprenorphine tx. I have considered the risks of abuse, dependence, addiction and diversion. I believe that it is clinically appropriate for the pt to continue current buprenorphine treatment. Recommend pt to keep  buprenorphine in a safe place. Do not share buprenorphine with other people which is illegal. Recommend to keep narcan available in case of opioid OD.   Pt should not drive or operate machinery if incoordination or confusion occurs. f/u as scheduled

## 2025-04-09 DIAGNOSIS — F33.1 MAJOR DEPRESSIVE DISORDER, RECURRENT, MODERATE: ICD-10-CM

## 2025-04-09 DIAGNOSIS — K64.9 UNSPECIFIED HEMORRHOIDS: ICD-10-CM

## 2025-04-09 RX ORDER — PAROXETINE HYDROCHLORIDE 40 MG/1
TABLET, FILM COATED ORAL
Qty: 90 TABLET | Refills: 3 | Status: SHIPPED | OUTPATIENT
Start: 2025-04-09

## 2025-04-09 RX ORDER — HYDROCORTISONE 25 MG/G
CREAM TOPICAL
Qty: 30 G | Refills: 6 | Status: SHIPPED | OUTPATIENT
Start: 2025-04-09

## 2025-04-11 ENCOUNTER — APPOINTMENT (OUTPATIENT)
Dept: PRIMARY CARE | Facility: CLINIC | Age: 70
End: 2025-04-11
Payer: MEDICARE

## 2025-04-11 VITALS — SYSTOLIC BLOOD PRESSURE: 120 MMHG | DIASTOLIC BLOOD PRESSURE: 65 MMHG

## 2025-04-11 DIAGNOSIS — F11.20 UNCOMPLICATED OPIOID DEPENDENCE (MULTI): ICD-10-CM

## 2025-04-11 PROCEDURE — 1159F MED LIST DOCD IN RCRD: CPT | Performed by: FAMILY MEDICINE

## 2025-04-11 PROCEDURE — G2211 COMPLEX E/M VISIT ADD ON: HCPCS | Performed by: FAMILY MEDICINE

## 2025-04-11 PROCEDURE — 1036F TOBACCO NON-USER: CPT | Performed by: FAMILY MEDICINE

## 2025-04-11 PROCEDURE — 3078F DIAST BP <80 MM HG: CPT | Performed by: FAMILY MEDICINE

## 2025-04-11 PROCEDURE — 3074F SYST BP LT 130 MM HG: CPT | Performed by: FAMILY MEDICINE

## 2025-04-11 PROCEDURE — 99213 OFFICE O/P EST LOW 20 MIN: CPT | Performed by: FAMILY MEDICINE

## 2025-04-11 PROCEDURE — 1160F RVW MEDS BY RX/DR IN RCRD: CPT | Performed by: FAMILY MEDICINE

## 2025-04-11 RX ORDER — BUPRENORPHINE AND NALOXONE 8; 2 MG/1; MG/1
1 FILM, SOLUBLE BUCCAL; SUBLINGUAL DAILY
Qty: 28 FILM | Refills: 0 | Status: SHIPPED | OUTPATIENT
Start: 2025-04-11

## 2025-04-11 NOTE — PROGRESS NOTES
Chief complaint: Opioid dependence treatment  HPI: Pt performs daily activities well with current buprenorphine treatment.  Cravings for opioids and withdrawals symptoms were controlled.  Pt denies abusing any opioids. Pt denies drinking eoth. Pt has been attending meeting or counseling for opioid dependence. Pt has been taking buprenorphine as dir. Pt denies having headache, constipation, mental cloudiness, fatigue, insomnia, drowsiness. Pt would have cravings and withdrawals without buprenorphine treatment. No depressed mood. No HI/SI.   PE: No acute distress, well groomed, eyes: normal pupil size, no sclera icterus, normal respiration effort. Lungs: CTA bilaterally, heart: RRR, abdomen: soft, no tenderness, normal BS, Good mood   A/P: Opioid dependence, Pt denies any opioid abuse. Cravings for opioids and withdrawals were controlled.    I reviewed pt's updated OARRS report.  The OARRS report showed that pt filled buprenorphine as prescribed. No opioid shopping.   Pt understood to avoid use  Eoth while on buprenorphine due to potential interactions causing death. Inform pt that buprenorphine is addictive. Inform pt that selling or giving away buprenorphine is against the law. Recommend pt to keep buprenorphine out of reach of children. Recommend to keep naloxone kits available in case of opioid OD. Recommend  counseling for opioid dependence. Pt should not drive or operate machinery if incoordination or confusion occurs. I have considered the risks of abuse, dependence, addiction and diversion. I believe that it is clinically appropriate for the pt to continue buprenorphine treatment. Caution patient that transmucosal buprenorphine can increase the risk of tooth decay, cavities, oral infections, and loss of teeth. Recommend the pt to see a dentist for dental exam regularly. Recommend pt to gently rinse teeth and gums with water after the medicine has completely dissolved. Informed pt the alternative medical  treatments for opioid dependence such as SUBLOCADE injection, vivitrol shot, in-patient rehabilitation are available. f/u as scheduled.

## 2025-04-14 DIAGNOSIS — I10 ESSENTIAL (PRIMARY) HYPERTENSION: ICD-10-CM

## 2025-04-14 RX ORDER — LISINOPRIL 20 MG/1
TABLET ORAL
Qty: 90 TABLET | Refills: 3 | Status: SHIPPED | OUTPATIENT
Start: 2025-04-14

## 2025-05-07 LAB
25(OH)D3+25(OH)D2 SERPL-MCNC: 43 NG/ML (ref 30–100)
ALBUMIN SERPL-MCNC: 4.5 G/DL (ref 3.6–5.1)
ALP SERPL-CCNC: 88 U/L (ref 37–153)
ALT SERPL-CCNC: 27 U/L (ref 6–29)
ANION GAP SERPL CALCULATED.4IONS-SCNC: 11 MMOL/L (CALC) (ref 7–17)
AST SERPL-CCNC: 24 U/L (ref 10–35)
BILIRUB SERPL-MCNC: 0.6 MG/DL (ref 0.2–1.2)
BUN SERPL-MCNC: 10 MG/DL (ref 7–25)
CALCIUM SERPL-MCNC: 9.6 MG/DL (ref 8.6–10.4)
CHLORIDE SERPL-SCNC: 102 MMOL/L (ref 98–110)
CHOLEST SERPL-MCNC: 185 MG/DL
CHOLEST/HDLC SERPL: 3.4 (CALC)
CO2 SERPL-SCNC: 26 MMOL/L (ref 20–32)
CREAT SERPL-MCNC: 0.82 MG/DL (ref 0.5–1.05)
EGFRCR SERPLBLD CKD-EPI 2021: 77 ML/MIN/1.73M2
EST. AVERAGE GLUCOSE BLD GHB EST-MCNC: 126 MG/DL
EST. AVERAGE GLUCOSE BLD GHB EST-SCNC: 7 MMOL/L
GLUCOSE SERPL-MCNC: 123 MG/DL (ref 65–99)
HBA1C MFR BLD: 6 %
HDLC SERPL-MCNC: 55 MG/DL
LDLC SERPL CALC-MCNC: 105 MG/DL (CALC)
NONHDLC SERPL-MCNC: 130 MG/DL (CALC)
POTASSIUM SERPL-SCNC: 4.1 MMOL/L (ref 3.5–5.3)
PROT SERPL-MCNC: 7.2 G/DL (ref 6.1–8.1)
SODIUM SERPL-SCNC: 139 MMOL/L (ref 135–146)
TRIGL SERPL-MCNC: 140 MG/DL

## 2025-05-09 ENCOUNTER — APPOINTMENT (OUTPATIENT)
Dept: PRIMARY CARE | Facility: CLINIC | Age: 70
End: 2025-05-09
Payer: MEDICARE

## 2025-05-09 VITALS — SYSTOLIC BLOOD PRESSURE: 121 MMHG | DIASTOLIC BLOOD PRESSURE: 65 MMHG

## 2025-05-09 DIAGNOSIS — F11.20 UNCOMPLICATED OPIOID DEPENDENCE (MULTI): ICD-10-CM

## 2025-05-09 PROCEDURE — 1036F TOBACCO NON-USER: CPT | Performed by: FAMILY MEDICINE

## 2025-05-09 PROCEDURE — 99214 OFFICE O/P EST MOD 30 MIN: CPT | Performed by: FAMILY MEDICINE

## 2025-05-09 PROCEDURE — 80305 DRUG TEST PRSMV DIR OPT OBS: CPT | Performed by: FAMILY MEDICINE

## 2025-05-09 PROCEDURE — 3074F SYST BP LT 130 MM HG: CPT | Performed by: FAMILY MEDICINE

## 2025-05-09 PROCEDURE — 1159F MED LIST DOCD IN RCRD: CPT | Performed by: FAMILY MEDICINE

## 2025-05-09 PROCEDURE — 1160F RVW MEDS BY RX/DR IN RCRD: CPT | Performed by: FAMILY MEDICINE

## 2025-05-09 PROCEDURE — G2211 COMPLEX E/M VISIT ADD ON: HCPCS | Performed by: FAMILY MEDICINE

## 2025-05-09 PROCEDURE — 3078F DIAST BP <80 MM HG: CPT | Performed by: FAMILY MEDICINE

## 2025-05-09 RX ORDER — BUPRENORPHINE AND NALOXONE 8; 2 MG/1; MG/1
1 FILM, SOLUBLE BUCCAL; SUBLINGUAL DAILY
Qty: 28 FILM | Refills: 0 | Status: SHIPPED | OUTPATIENT
Start: 2025-05-09

## 2025-05-09 NOTE — PROGRESS NOTES
Chief complaint: Opioid dependence treatment  HPI: Pt denies cravings for opioids and withdrawals symptoms while on buprenorphine.  Pt denies abusing any opioids. Pt has been taking buprenorphine as dir. Pt denies drinking eoth. Pt has been attending meeting or counseling for opioid dependence.  Pt denies having headache, constipation, insomnia, drowsiness. Pt would have cravings and withdrawals without buprenorphine treatment. No depressed mood.   PE: No acute distress, well groomed, eyes: normal pupil size, no sclera icterus,  Lungs: CTA bilaterally, heart: RRR, abdomen: soft, no tenderness, normal BS, no depressed mood. No hi/si  A/P: Opioid dependence, Cravings for opioids and withdrawals were controlled.   Pt denies any opioid abuse. I reviewed pt's updated OARRS report.  The OARRS report showed no opioid shopping. I reviewed pt's uds done in office today. Uds was negative for opioid.  no Eoth while on buprenorphine.  Inform pt that buprenorphine diversion is against the law. Recommend pt to keep buprenorphine out of reach of children. Recommend  counseling for opioid dependence. Pt should not drive or operate machinery if incoordination or confusion occurs. I have considered the risks of abuse, dependence, addiction and diversion. I believe that it is clinically appropriate for the pt to continue buprenorphine treatment.  Recommend pt to gently rinse teeth and gums with water after the medicine has completely dissolved. f/u as scheduled.

## 2025-05-12 ENCOUNTER — APPOINTMENT (OUTPATIENT)
Dept: PRIMARY CARE | Facility: CLINIC | Age: 70
End: 2025-05-12
Payer: MEDICARE

## 2025-05-12 VITALS
HEART RATE: 83 BPM | BODY MASS INDEX: 29.16 KG/M2 | HEIGHT: 65 IN | WEIGHT: 175 LBS | DIASTOLIC BLOOD PRESSURE: 78 MMHG | SYSTOLIC BLOOD PRESSURE: 112 MMHG

## 2025-05-12 DIAGNOSIS — R73.02 IGT (IMPAIRED GLUCOSE TOLERANCE): ICD-10-CM

## 2025-05-12 DIAGNOSIS — E78.5 DYSLIPIDEMIA, GOAL LDL BELOW 100: ICD-10-CM

## 2025-05-12 DIAGNOSIS — I10 ESSENTIAL HYPERTENSION: ICD-10-CM

## 2025-05-12 DIAGNOSIS — E55.9 VITAMIN D DEFICIENCY: ICD-10-CM

## 2025-05-12 DIAGNOSIS — T46.4X5A COUGH DUE TO ACE INHIBITOR: ICD-10-CM

## 2025-05-12 DIAGNOSIS — Z00.00 MEDICARE ANNUAL WELLNESS VISIT, SUBSEQUENT: Primary | ICD-10-CM

## 2025-05-12 DIAGNOSIS — R05.8 COUGH DUE TO ACE INHIBITOR: ICD-10-CM

## 2025-05-12 DIAGNOSIS — J45.20 MILD INTERMITTENT ASTHMA WITHOUT COMPLICATION (HHS-HCC): ICD-10-CM

## 2025-05-12 RX ORDER — LOSARTAN POTASSIUM 50 MG/1
50 TABLET ORAL DAILY
Qty: 100 TABLET | Refills: 3 | Status: SHIPPED | OUTPATIENT
Start: 2025-05-12 | End: 2026-06-16

## 2025-05-12 ASSESSMENT — ANXIETY QUESTIONNAIRES
1. FEELING NERVOUS, ANXIOUS, OR ON EDGE: NOT AT ALL
IF YOU CHECKED OFF ANY PROBLEMS ON THIS QUESTIONNAIRE, HOW DIFFICULT HAVE THESE PROBLEMS MADE IT FOR YOU TO DO YOUR WORK, TAKE CARE OF THINGS AT HOME, OR GET ALONG WITH OTHER PEOPLE: NOT DIFFICULT AT ALL
4. TROUBLE RELAXING: NOT AT ALL
6. BECOMING EASILY ANNOYED OR IRRITABLE: NOT AT ALL
7. FEELING AFRAID AS IF SOMETHING AWFUL MIGHT HAPPEN: NOT AT ALL
2. NOT BEING ABLE TO STOP OR CONTROL WORRYING: NOT AT ALL
GAD7 TOTAL SCORE: 0
3. WORRYING TOO MUCH ABOUT DIFFERENT THINGS: NOT AT ALL
5. BEING SO RESTLESS THAT IT IS HARD TO SIT STILL: NOT AT ALL

## 2025-05-12 ASSESSMENT — ENCOUNTER SYMPTOMS
OCCASIONAL FEELINGS OF UNSTEADINESS: 0
LOSS OF SENSATION IN FEET: 0
DEPRESSION: 0

## 2025-05-12 ASSESSMENT — PATIENT HEALTH QUESTIONNAIRE - PHQ9
2. FEELING DOWN, DEPRESSED OR HOPELESS: NOT AT ALL
1. LITTLE INTEREST OR PLEASURE IN DOING THINGS: NOT AT ALL
SUM OF ALL RESPONSES TO PHQ9 QUESTIONS 1 AND 2: 0
1. LITTLE INTEREST OR PLEASURE IN DOING THINGS: NOT AT ALL
SUM OF ALL RESPONSES TO PHQ9 QUESTIONS 1 AND 2: 0
2. FEELING DOWN, DEPRESSED OR HOPELESS: NOT AT ALL

## 2025-05-12 ASSESSMENT — ACTIVITIES OF DAILY LIVING (ADL)
MANAGING_FINANCES: INDEPENDENT
TAKING_MEDICATION: INDEPENDENT
BATHING: INDEPENDENT
DRESSING: INDEPENDENT
GROCERY_SHOPPING: INDEPENDENT
DOING_HOUSEWORK: INDEPENDENT

## 2025-05-12 NOTE — PROGRESS NOTES
"Subjective   Reason for Visit: Jenna Ca is an 69 y.o. female here for a Medicare Wellness visit.          Reviewed all medications by prescribing practitioner or clinical pharmacist (such as prescriptions, OTCs, herbal therapies and supplements) and documented in the medical record.    HPI    Patient Care Team:  Jared Rojo DO as PCP - General (Internal Medicine)  Miguelito Mota MD PhD as PCP - [employer] PCP     Review of Systems    Objective   Vitals:  /78   Pulse 83   Ht 1.651 m (5' 5\")   Wt 79.4 kg (175 lb)   LMP  (LMP Unknown)   BMI 29.12 kg/m²       Physical Exam    Assessment & Plan  Dyslipidemia, goal LDL below 100    Orders:    Follow Up In [facility] - PCP - Medicare Annual    IGT (impaired glucose tolerance)    Orders:    Follow Up In [facility] - PCP - Medicare Annual              "

## 2025-05-12 NOTE — PROGRESS NOTES
"Subjective   Reason for Visit: Jenna Ca is an 69 y.o. female here for a Medicare Wellness visit.     Past Medical, Surgical, and Family History reviewed and updated in chart.    Reviewed all medications by prescribing practitioner or clinical pharmacist (such as prescriptions, OTCs, herbal therapies and supplements) and documented in the medical record.    HPI    Patient Care Team:  Jared Rojo DO as PCP - General (Internal Medicine)  Miguelito Mota MD PhD as PCP - [employer] PCP     Review of Systems   All other systems reviewed and are negative.      Objective   Vitals:  /78   Pulse 83   Ht 1.651 m (5' 5\")   Wt 79.4 kg (175 lb)   LMP  (LMP Unknown)   BMI 29.12 kg/m²       Physical Exam  Vitals and nursing note reviewed.   Constitutional:       General: She is not in acute distress.     Appearance: Normal appearance. She is well-developed. She is not toxic-appearing.   HENT:      Head: Normocephalic and atraumatic.      Right Ear: Tympanic membrane and external ear normal.      Left Ear: Tympanic membrane and external ear normal.      Nose: Nose normal.      Mouth/Throat:      Mouth: Mucous membranes are moist.      Pharynx: Oropharynx is clear. No oropharyngeal exudate or posterior oropharyngeal erythema.      Tonsils: No tonsillar exudate. 2+ on the right. 2+ on the left.   Eyes:      Extraocular Movements: Extraocular movements intact.      Conjunctiva/sclera: Conjunctivae normal.   Cardiovascular:      Rate and Rhythm: Normal rate and regular rhythm.      Pulses: Normal pulses.      Heart sounds: Normal heart sounds. No murmur heard.  Pulmonary:      Effort: Pulmonary effort is normal.      Breath sounds: Normal breath sounds.   Abdominal:      General: Abdomen is flat. Bowel sounds are normal.      Palpations: Abdomen is soft.   Musculoskeletal:      Cervical back: Neck supple.   Lymphadenopathy:      Cervical: No cervical adenopathy.   Skin:     General: Skin is warm " and dry.      Findings: No rash.   Neurological:      Mental Status: She is alert. Mental status is at baseline.   Psychiatric:         Mood and Affect: Mood normal.         Behavior: Behavior normal.         Thought Content: Thought content normal.         Judgment: Judgment normal.     Lifestyle Recommendations  I recommend a whole-food plant-based diet, an eating pattern that encourages the consumption of unrefined plant foods (such as fruits, vegetables, tubers, whole grains, legumes, nuts and seeds) and discourages meats, dairy products, eggs and processed foods.     The AHA/ACC recommends that the patient consume a dietary pattern that emphasizes intake of vegetables, fruits, and whole grains; includes low-fat dairy products, poultry, fish, legumes, non-tropical vegetable oils, and nuts; and limits intake of sodium, sweets, sugar-sweetened beverages, and red meats.  Adapt this dietary pattern to appropriate calorie requirements (a 500-750 kcal/day deficit to loose weight), personal and cultural food preferences, and nutrition therapy for other medical conditions (including diabetes).  Achieve this pattern by following plans such as the [place] Mediterranean, DASH dietary pattern, or AHA diet.     Engage in 2 hours and 30 minutes per week of moderate-intensity physical activity, or 1 hour and 15 minutes (75 minutes) per week of vigorous-intensity aerobic physical activity, or an equivalent combination of moderate and vigorous-intensity aerobic physical activity. Aerobic activity should be performed in episodes of at least 10 minutes preferably spread throughout the week.     Adhering to a heart healthy diet, regular exercise habits, avoidance of tobacco products, and maintenance of a healthy weight are crucial components of their heart disease risk reduction.  This may not meet the criteria for a clinical depression diagnosis. Symptoms were reviewed with Jenna.  Follow-up within the next 3 months is recommended to  re-assess symptoms and monitor mental health status.     Assessment & Plan  Dyslipidemia, goal LDL below 100  LDL cholesterol 105 with HDL 55 triglyceride level of 140 calculate ASCVD risk score 8.6 continue rosuvastatin 5 mg every other day reevaluate with next visit in 3 months  Orders:    Follow Up In [facility] - PCP - Medicare Annual    Referral to Gastroenterology; Future    Lipid Panel; Future    Follow Up In [facility] PCP [facility]; Future    IGT (impaired glucose tolerance)  Work on improving diet with recent hemoglobin A1c increased to 6.0 with fasting blood sugar of 123 reevaluate next visit weight stable  Orders:    Follow Up In [facility] - PCP - Medicare Annual    Referral to Gastroenterology; Future    Comprehensive Metabolic Panel; Future    Hemoglobin A1C; Future    Lipid Panel; Future    Albumin-Creatinine Ratio, Urine Random; Future    Follow Up In [facility]; Future    Cough due to ACE inhibitor  Replace lisinopril 20 mg daily with losartan 50 mg daily and reevaluate  Orders:    losartan (Cozaar) 50 mg tablet; Take 1 tablet (50 mg) by mouth once daily.    Referral to Gastroenterology; Future    Follow Up In [facility] PCP [facility]; Future    Essential hypertension  Blood pressure stable on current regimen will switch lisinopril to losartanWill 50 mg daily  Orders:    losartan (Cozaar) 50 mg tablet; Take 1 tablet (50 mg) by mouth once daily.    Referral to Gastroenterology; Future    Follow Up In [facility]; Future    Medicare annual wellness visit, subsequent  Discussed advanced medical directives with 's medical power of  we will send toolkit for discussion.  Prescription written for screening mammogram follow-up on bone density and colon cancer screening with a history of colon polyps and irritable bowel symptoms with bouts of colitis experienced in the setting  of irritable bowel       Vitamin D deficiency  Check vitamin D levels in the setting of longstanding fibromyalgia and chronic cervical and lumbar radiculopathy  Orders:    Referral to Gastroenterology; Future    Mild intermittent asthma without complication ([facility]-HCC)  Symptoms stable with no dry cough multifactorial stable on pantoprazole 40 mg twice a day will change lisinopril to losartan 50 mg daily continue montelukast 10 mg daily with treatment of allergies with fluticasone reevaluate  Orders:    Referral to Gastroenterology; Future           Advance Directives Discussion  16 - 20 minutes were spent discussing Advanced Care Planning (including a Living Will, Medical Power Of , as well as specific end of life choices and/or directives). The details of that discussion were documented in Advanced Directives Discussion section of the medical record.     Cardiac Risk Assessment  15 - 20 minutes were spent discussing Cardiovascular risk and, if needed, lifestyle modifications were recommended, including nutritional choices, exercise, and elimination of habits contributing to risk.   Aspirin use/disuse was discussed following the guidelines below:  low dose ASA ( mg) should be considered:    If prior Heart Attack/Stroke/Peripheral vascular disease:  Generally recommend daily low dose aspirin unless extremely high bleeding risk (e.g., gastrointestinal).    If no prior Heart Attack/Stroke/Peripheral vascular disease:              Age over 70: Do not use Aspirin for prevention    Age less than 70 and 10-year cardiovascular disease risk is >20%: use low dose Aspirin for prevention.                 Depression Screening  5 - 10 minutes were spent screening for depression.

## 2025-05-12 NOTE — ASSESSMENT & PLAN NOTE
Orders:    Follow Up In [facility] - PCP - Medicare Annual  
  Orders:    Follow Up In [facility] - PCP - Medicare Annual    
Blood pressure stable on current regimen will switch lisinopril to losartanWill 50 mg daily  Orders:    losartan (Cozaar) 50 mg tablet; Take 1 tablet (50 mg) by mouth once daily.    Referral to Gastroenterology; Future    Follow Up In [facility] - PCP - Established; Future    
Check vitamin D levels in the setting of longstanding fibromyalgia and chronic cervical and lumbar radiculopathy  Orders:    Referral to Gastroenterology; Future    
Discussed advanced medical directives with 's medical power of  we will send toolkit for discussion.  Prescription written for screening mammogram follow-up on bone density and colon cancer screening with a history of colon polyps and irritable bowel symptoms with bouts of colitis experienced in the setting of irritable bowel       
LDL cholesterol 105 with HDL 55 triglyceride level of 140 calculate ASCVD risk score 8.6 continue rosuvastatin 5 mg every other day reevaluate with next visit in 3 months  Orders:    Follow Up In [facility] - PCP - Medicare Annual    Referral to Gastroenterology; Future    Lipid Panel; Future    Follow Up In [facility] - PCP - Established; Future    
Replace lisinopril 20 mg daily with losartan 50 mg daily and reevaluate  Orders:    losartan (Cozaar) 50 mg tablet; Take 1 tablet (50 mg) by mouth once daily.    Referral to Gastroenterology; Future    Follow Up In [facility] - PCP - Established; Future    
Symptoms stable with no dry cough multifactorial stable on pantoprazole 40 mg twice a day will change lisinopril to losartan 50 mg daily continue montelukast 10 mg daily with treatment of allergies with fluticasone reevaluate  Orders:    Referral to Gastroenterology; Future    
Abdomen soft, nontender, nondistended, bowel sounds present in all 4 quadrants.

## 2025-05-31 DIAGNOSIS — M54.12 RADICULOPATHY, CERVICAL REGION: ICD-10-CM

## 2025-06-02 RX ORDER — GABAPENTIN 600 MG/1
600 TABLET ORAL 3 TIMES DAILY
Qty: 270 TABLET | Refills: 3 | Status: SHIPPED | OUTPATIENT
Start: 2025-06-02

## 2025-06-05 DIAGNOSIS — J45.20 MILD INTERMITTENT ASTHMA WITHOUT COMPLICATION (HHS-HCC): ICD-10-CM

## 2025-06-05 RX ORDER — ALBUTEROL SULFATE 90 UG/1
1 INHALANT RESPIRATORY (INHALATION) EVERY 6 HOURS PRN
Qty: 18 G | Refills: 3 | Status: SHIPPED | OUTPATIENT
Start: 2025-06-05

## 2025-06-06 ENCOUNTER — APPOINTMENT (OUTPATIENT)
Dept: PRIMARY CARE | Facility: CLINIC | Age: 70
End: 2025-06-06
Payer: MEDICARE

## 2025-06-06 VITALS — SYSTOLIC BLOOD PRESSURE: 123 MMHG | DIASTOLIC BLOOD PRESSURE: 67 MMHG

## 2025-06-06 DIAGNOSIS — F11.20 UNCOMPLICATED OPIOID DEPENDENCE (MULTI): ICD-10-CM

## 2025-06-06 PROCEDURE — 1159F MED LIST DOCD IN RCRD: CPT | Performed by: FAMILY MEDICINE

## 2025-06-06 PROCEDURE — 80305 DRUG TEST PRSMV DIR OPT OBS: CPT | Performed by: FAMILY MEDICINE

## 2025-06-06 PROCEDURE — 1160F RVW MEDS BY RX/DR IN RCRD: CPT | Performed by: FAMILY MEDICINE

## 2025-06-06 PROCEDURE — 99214 OFFICE O/P EST MOD 30 MIN: CPT | Performed by: FAMILY MEDICINE

## 2025-06-06 PROCEDURE — 3078F DIAST BP <80 MM HG: CPT | Performed by: FAMILY MEDICINE

## 2025-06-06 PROCEDURE — G2211 COMPLEX E/M VISIT ADD ON: HCPCS | Performed by: FAMILY MEDICINE

## 2025-06-06 PROCEDURE — 3074F SYST BP LT 130 MM HG: CPT | Performed by: FAMILY MEDICINE

## 2025-06-06 PROCEDURE — 1036F TOBACCO NON-USER: CPT | Performed by: FAMILY MEDICINE

## 2025-06-06 RX ORDER — BUPRENORPHINE AND NALOXONE 8; 2 MG/1; MG/1
1 FILM, SOLUBLE BUCCAL; SUBLINGUAL DAILY
Qty: 28 FILM | Refills: 0 | Status: SHIPPED | OUTPATIENT
Start: 2025-06-06

## 2025-06-06 NOTE — PROGRESS NOTES
Chief complaint: Opioid dependence treatment  HPI: Pt denies abusing any opioids. Cravings for opioids and withdrawals symptoms were controlled with buprenorphine.   Pt has been taking buprenorphine as prescribed. Pt denies drinking eoth. Pt has been having counseling for opioid dependence.  Pt denies having headache, constipation,  confusion.  No depressed mood. Pt would not function well without buprenorphine.  PE: No acute distress, well groomed, eyes: normal pupil size, no sclera icterus,  Lungs: CTA bilaterally, heart: RRR, abdomen: soft, no tenderness, normal BS, no depressed mood.   A/P: Opioid dependence, doing well. No cravings for opioids or  withdrawals.   Pt denies any opioid abuse. I reviewed pt's uds done in office today. UdS was negative for opioid. I reviewed pt's updated OARRS report.  The OARRS report showed no opioid shopping. Recommend to avoid  alcohol while on buprenorphine.  Do not share or sell buprenorphine. Lock up buprenorphine to keep it out of reach of children. Cont   counseling for opioid dependence. Pt should not drive or operate machinery if incoordination or confusion occurs. I have considered the risks of abuse, dependence, addiction and diversion. I believe that it is clinically appropriate for the pt to continue buprenorphine treatment.   f/u as scheduled.

## 2025-06-27 ENCOUNTER — APPOINTMENT (OUTPATIENT)
Dept: PRIMARY CARE | Facility: CLINIC | Age: 70
End: 2025-06-27
Payer: MEDICARE

## 2025-06-27 DIAGNOSIS — F11.20 UNCOMPLICATED OPIOID DEPENDENCE (MULTI): ICD-10-CM

## 2025-06-27 PROCEDURE — G2211 COMPLEX E/M VISIT ADD ON: HCPCS | Performed by: FAMILY MEDICINE

## 2025-06-27 PROCEDURE — 80305 DRUG TEST PRSMV DIR OPT OBS: CPT | Performed by: FAMILY MEDICINE

## 2025-06-27 PROCEDURE — 99214 OFFICE O/P EST MOD 30 MIN: CPT | Performed by: FAMILY MEDICINE

## 2025-06-27 RX ORDER — BUPRENORPHINE AND NALOXONE 8; 2 MG/1; MG/1
1 FILM, SOLUBLE BUCCAL; SUBLINGUAL DAILY
Qty: 28 FILM | Refills: 0 | Status: SHIPPED | OUTPATIENT
Start: 2025-06-27

## 2025-06-27 NOTE — PROGRESS NOTES
Chief complaint: Opioid dependence treatment  HPI: Pt is doing well. no opioids abuse. Cravings for opioids and withdrawals symptoms were controlled.   Pt denies drinking eoth. Pt has been having counseling for opioid dependence.  no constipation,  imbalance, confusion.  No depressed mood.   PE: No acute distress,  eyes: normal pupil size, no sclera icterus,  Lungs: CTA bilaterally, heart: RRR, abdomen: soft, no tenderness, normal BS, no depressed mood. No hi/si   A/P: Opioid dependence, pt denies cravings for opioids or  withdrawals.   Pt denies opioid abuse. I reviewed pt's uds done in office today. UdS was negative for opioid. I reviewed pt's updated OARRS report.  The OARRS report showed pt filled buprenorphine as prescribed. no alcohol while on buprenorphine.  Informed the pt that buprenorphine diversion is illegal.  keep buprenorphine out of reach of children. Encourage  counseling for opioid dependence. Pt should not drive or operate machinery if incoordination or confusion occurs. I have considered the risks of abuse, dependence, addiction and diversion. I believe that it is clinically appropriate for the pt to continue buprenorphine treatment.   f/u as scheduled.

## 2025-06-30 ENCOUNTER — TELEPHONE (OUTPATIENT)
Facility: CLINIC | Age: 70
End: 2025-06-30
Payer: MEDICARE

## 2025-06-30 DIAGNOSIS — Z12.11 SCREENING FOR COLON CANCER: Primary | ICD-10-CM

## 2025-06-30 NOTE — TELEPHONE ENCOUNTER
----- Message from Adry ARITA sent at 6/30/2025  9:36 AM EDT -----  Regarding: colon recall  Per chart recall, pt due for repeat colonoscopy  - can you place the order for the screening colonoscopy and send to [facility] .  Thank you.    #Chart reviewed for age, BMI and anticoagulants.      Colon 2020 - repeat 5 years

## 2025-07-03 NOTE — TELEPHONE ENCOUNTER
[facility] OPEN ACCESS COLONOSCOPY SCREENING FORM       [facility]  When: [facility]  ANESTHESIA SCREENING   1. Height 5'5     Weight 175  BMI 29.12    (Clinic Visit if BMI over 42)   2. Are you on any blood thinning medications including  mg? NO  ( Clinic visit if yes)  Name of blood thinning medication: NO  3. Are you on oxygen at home? NO (Clinic visit if yes)   4. Have you seen your primary care provider within the last 12 months? YES (Clinic visit if NO)   5. History of:   Pacemaker/Defibrillator NO                          Heart Failure NO                         Heart Disease NO                          Stroke NO   Details of cardiac history: HTN - ON MEDS   (Clinic visit if history of heart failure or new diagnosis/new intervention in last year)     6. Do you have renal failure or require dialysis? NO  7. Do you have sleep Apnea? NO  8. Are you on insulin for diabetes? NO If yes, patient should discuss johann-procedural medication adjustment with PCP.   9. Are you currently on GLP-1 or SGLT2 inhibitors? NO  10. Do you have a history of seizures? NO (If YES- indicate recent or NOT recent. Anesthesia to review if recent.)  11.) Do you have a history of Drug/Alcohol Abuse? NO  (If YES- indicate how recent. If within the last year Patient needs to be seen in the office)    GI SCREENING   Have you had a positive stool based screening test? (i.e. fecal occult, FIT, or Cologuard) NO   ? If yes and pass anesthesia screen, no further questions are needed. Schedule [facility] procedure.   ? If yes and they do NOT pass anesthesia screen then refer to office for expedited review/visit.   ? If no, proceed to the following GI screening questions to determine if office visit is needed.      If patient answers YES to any of the following please send to the office for an appointment.  1. Do you have chronic diarrhea lasting longer than 3 weeks? YES   2. Do you have a large amount of rectal bleeding or  frequent rectal bleeding? NO  3. Do you have significant, unexplained weight loss? NO    Office visit required YES - DIARRHEA   Open Access APPROVED NO

## 2025-07-25 ENCOUNTER — APPOINTMENT (OUTPATIENT)
Dept: PRIMARY CARE | Facility: CLINIC | Age: 70
End: 2025-07-25
Payer: MEDICARE

## 2025-08-01 ENCOUNTER — APPOINTMENT (OUTPATIENT)
Dept: PRIMARY CARE | Facility: CLINIC | Age: 70
End: 2025-08-01
Payer: MEDICARE

## 2025-08-01 VITALS — DIASTOLIC BLOOD PRESSURE: 64 MMHG | SYSTOLIC BLOOD PRESSURE: 123 MMHG

## 2025-08-01 DIAGNOSIS — F11.20 UNCOMPLICATED OPIOID DEPENDENCE (MULTI): ICD-10-CM

## 2025-08-01 PROCEDURE — 80305 DRUG TEST PRSMV DIR OPT OBS: CPT | Performed by: FAMILY MEDICINE

## 2025-08-01 PROCEDURE — 3078F DIAST BP <80 MM HG: CPT | Performed by: FAMILY MEDICINE

## 2025-08-01 PROCEDURE — G2211 COMPLEX E/M VISIT ADD ON: HCPCS | Performed by: FAMILY MEDICINE

## 2025-08-01 PROCEDURE — 3074F SYST BP LT 130 MM HG: CPT | Performed by: FAMILY MEDICINE

## 2025-08-01 PROCEDURE — 99214 OFFICE O/P EST MOD 30 MIN: CPT | Performed by: FAMILY MEDICINE

## 2025-08-01 RX ORDER — BUPRENORPHINE AND NALOXONE 8; 2 MG/1; MG/1
1 FILM, SOLUBLE BUCCAL; SUBLINGUAL DAILY
Qty: 28 FILM | Refills: 0 | Status: SHIPPED | OUTPATIENT
Start: 2025-08-01

## 2025-08-01 NOTE — PROGRESS NOTES
Chief complaint: Opioid dependence treatment  HPI: Pt denies opioids abuse. Cravings for opioids and withdrawals symptoms were controlled.  Pt denies drinking eoth. Pt has been having counseling for opioid dependence.  no agitation, sweating, constipation, confusion.  No depressed mood.   PE: No acute distress,  eyes: normal pupil size, no sclera icterus,  Lungs: CTA bilaterally, heart: RRR, abdomen: soft, no tenderness, normal BS, no depressed mood.   A/P: Opioid dependence, no cravings for opioids or  withdrawals.   Pt denies opioid abuse. I reviewed pt's updated OARRS report.  The OARRS report showed pt filled buprenorphine as prescribed. no alcohol while on buprenorphine.  I reviewed pt's uds done in office today. UDS was negative for opioid.  continue  counseling for opioid dependence. Pt should not drive or operate machinery if incoordination or confusion occurs. I have considered the risks of abuse, dependence, addiction and diversion. I believe that it is clinically appropriate for the pt to continue buprenorphine treatment.   f/u as scheduled.

## 2025-08-12 ENCOUNTER — APPOINTMENT (OUTPATIENT)
Facility: CLINIC | Age: 70
End: 2025-08-12
Payer: MEDICARE

## 2025-08-12 VITALS
SYSTOLIC BLOOD PRESSURE: 152 MMHG | BODY MASS INDEX: 29.12 KG/M2 | WEIGHT: 175 LBS | DIASTOLIC BLOOD PRESSURE: 91 MMHG | HEART RATE: 76 BPM

## 2025-08-12 DIAGNOSIS — Z12.11 COLON CANCER SCREENING: ICD-10-CM

## 2025-08-12 DIAGNOSIS — R19.7 DIARRHEA IN ADULT PATIENT: Primary | ICD-10-CM

## 2025-08-12 PROBLEM — K52.9 CHRONIC DIARRHEA: Status: ACTIVE | Noted: 2025-08-12

## 2025-08-12 PROCEDURE — 3077F SYST BP >= 140 MM HG: CPT | Performed by: PHYSICIAN ASSISTANT

## 2025-08-12 PROCEDURE — 1036F TOBACCO NON-USER: CPT | Performed by: PHYSICIAN ASSISTANT

## 2025-08-12 PROCEDURE — 1160F RVW MEDS BY RX/DR IN RCRD: CPT | Performed by: PHYSICIAN ASSISTANT

## 2025-08-12 PROCEDURE — 1159F MED LIST DOCD IN RCRD: CPT | Performed by: PHYSICIAN ASSISTANT

## 2025-08-12 PROCEDURE — 99204 OFFICE O/P NEW MOD 45 MIN: CPT | Performed by: PHYSICIAN ASSISTANT

## 2025-08-12 PROCEDURE — 3080F DIAST BP >= 90 MM HG: CPT | Performed by: PHYSICIAN ASSISTANT

## 2025-08-12 RX ORDER — POLYETHYLENE GLYCOL 3350, SODIUM SULFATE ANHYDROUS, SODIUM BICARBONATE, SODIUM CHLORIDE, POTASSIUM CHLORIDE 236; 22.74; 6.74; 5.86; 2.97 G/4L; G/4L; G/4L; G/4L; G/4L
4 POWDER, FOR SOLUTION ORAL ONCE
Qty: 4000 ML | Refills: 0 | Status: SHIPPED | OUTPATIENT
Start: 2025-08-12 | End: 2025-08-12

## 2025-08-29 ENCOUNTER — APPOINTMENT (OUTPATIENT)
Dept: PRIMARY CARE | Facility: CLINIC | Age: 70
End: 2025-08-29
Payer: MEDICARE

## 2025-08-29 VITALS — DIASTOLIC BLOOD PRESSURE: 68 MMHG | SYSTOLIC BLOOD PRESSURE: 126 MMHG

## 2025-08-29 DIAGNOSIS — F11.20 UNCOMPLICATED OPIOID DEPENDENCE (MULTI): ICD-10-CM

## 2025-08-29 PROCEDURE — 1159F MED LIST DOCD IN RCRD: CPT | Performed by: FAMILY MEDICINE

## 2025-08-29 PROCEDURE — 1160F RVW MEDS BY RX/DR IN RCRD: CPT | Performed by: FAMILY MEDICINE

## 2025-08-29 PROCEDURE — 3078F DIAST BP <80 MM HG: CPT | Performed by: FAMILY MEDICINE

## 2025-08-29 PROCEDURE — 3074F SYST BP LT 130 MM HG: CPT | Performed by: FAMILY MEDICINE

## 2025-08-29 PROCEDURE — G2211 COMPLEX E/M VISIT ADD ON: HCPCS | Performed by: FAMILY MEDICINE

## 2025-08-29 PROCEDURE — 99213 OFFICE O/P EST LOW 20 MIN: CPT | Performed by: FAMILY MEDICINE

## 2025-08-29 PROCEDURE — 1036F TOBACCO NON-USER: CPT | Performed by: FAMILY MEDICINE

## 2025-08-29 RX ORDER — BUPRENORPHINE AND NALOXONE 8; 2 MG/1; MG/1
1 FILM, SOLUBLE BUCCAL; SUBLINGUAL DAILY
Qty: 28 FILM | Refills: 0 | Status: SHIPPED | OUTPATIENT
Start: 2025-08-29

## 2025-09-26 ENCOUNTER — APPOINTMENT (OUTPATIENT)
Dept: PRIMARY CARE | Facility: CLINIC | Age: 70
End: 2025-09-26
Payer: MEDICARE

## 2025-10-14 ENCOUNTER — APPOINTMENT (OUTPATIENT)
Dept: PRIMARY CARE | Facility: CLINIC | Age: 70
End: 2025-10-14
Payer: MEDICARE